# Patient Record
Sex: MALE | Race: ASIAN | NOT HISPANIC OR LATINO | Employment: FULL TIME
[De-identification: names, ages, dates, MRNs, and addresses within clinical notes are randomized per-mention and may not be internally consistent; named-entity substitution may affect disease eponyms.]

---

## 2019-11-08 ENCOUNTER — HEALTH MAINTENANCE LETTER (OUTPATIENT)
Age: 44
End: 2019-11-08

## 2020-01-29 ENCOUNTER — OFFICE VISIT (OUTPATIENT)
Dept: FAMILY MEDICINE | Facility: CLINIC | Age: 45
End: 2020-01-29
Payer: COMMERCIAL

## 2020-01-29 ENCOUNTER — TELEPHONE (OUTPATIENT)
Dept: OTOLARYNGOLOGY | Facility: CLINIC | Age: 45
End: 2020-01-29

## 2020-01-29 VITALS
WEIGHT: 179 LBS | HEART RATE: 64 BPM | DIASTOLIC BLOOD PRESSURE: 85 MMHG | OXYGEN SATURATION: 96 % | BODY MASS INDEX: 27.62 KG/M2 | SYSTOLIC BLOOD PRESSURE: 131 MMHG

## 2020-01-29 DIAGNOSIS — M27.8 MASS OF JAW: Primary | ICD-10-CM

## 2020-01-29 ASSESSMENT — PAIN SCALES - GENERAL: PAINLEVEL: NO PAIN (0)

## 2020-01-29 NOTE — PROGRESS NOTES
[facility]  Primary Care Center   Kareen Nunez MD PhD  01/29/2020      Chief Complaint:   Mass on jaw    History of Present Illness:   Belen Arredondo is a 44 year old male with a history of depression who presents for evaluation of a mass on his right jawline. This has been present for a year. It comes and goes and varies in size daily. It is not painful. He denies fever, chills, sore throat, ear pain, or ear pressure. He does note that his parotid gland hs been swollen for a long time. Two years ago he took a course of Augmentin for parotitis per Urgent Care.    Review of Systems:   Pertinent items are noted in HPI or as in patient entered ROS below, remainder of complete ROS is negative.   Fevers, chills, night sweats, fatigue, unintentional weight change: Negative   Vision change, double vision, red eyes: Negative  Hearing changes, sinus pain, throat pain: Negative   Chest pain, palpitations, pain with walking: Negative   Cough, shortness of breath, wheezing: Negative   Nausea, vomiting, diarrhea, constipation, abdominal pain, heartburn: Negative  Change in urine, incontinence, sexual dysfunction: Negative   Joint pain, muscle pain, swelling: Negative   Concerning lesions or moles, rash: Negative   Loss of strength or sensation, numbness or tingling, dizziness, headache: Negative   Excessive thirst, heat or cold intolerance: Negative   Concerning bumps, bleeding problems: Negative   Environmental allergies: Negative   Depression or anxiety, sleep problems: Negative    Active Medications:   No current outpatient medications.      Allergies:   Penicillins and Sulfa drugs      Past Medical History:  Major depressive disorder  Folliculitis     Past Surgical History:  History reviewed. No pertinent past surgical history.     Family History:   Diabetes - maternal grandmother  Hypertension - maternal grandmother       Social History:   The patient is , a current some day smoker (0.33 ppd), and does rarely  consume alcohol.     Physical Exam:   /85 (BP Location: Right arm, Patient Position: Sitting, Cuff Size: Adult Regular)   Pulse 64   Wt 81.2 kg (179 lb)   SpO2 96%   BMI 27.62 kg/m     Gen:  middle-aged male in NAD  HEENT: PERRL fundi benign.  No sinus tenderness. Ears clear with good light reflex. OP MMM no lesions.  No drainage. Stensen's and Montcalm's ducts not tender or inflamed. Facial asymmetry with prominence of salivary gland on his right side. There is a small non-tender, pea-size mass on right jawline.   Neck  Supple.  Thyroid not palpable.  No carotid bruits.  No LAD  CVS exam: S1, S2 normal, no murmur, click, rub or gallop, regular rate and rhythm,   Respiratory: Normal - Clear to auscultation without rales, rhonchi, or wheezing.     Assessment and Plan:  Belen Arredondo is a 44 year old male with a history of depression who presents for evaluation of a mass on his right jawline.    (R22.0) Mass of jaw  (primary encounter diagnosis)  Comment: This mass has been present for a year and has changed in size and at times gone away. Likely a cyst, but unclear on examination.  Plan: OTOLARYNGOLOGY REFERRAL        Referred to ENT for further evaluation.    Follow-up: next week for physical.         Scribe Disclosure:  I, Dulce Steward, am serving as a scribe to document services personally performed by Kareen Nunez MD PhD at this visit, based upon the provider's statements to me. All documentation has been reviewed by the aforementioned provider prior to being entered into the official medical record.     Portions of this medical record were completed by a scribe. UPON MY REVIEW AND AUTHENTICATION BY ELECTRONIC SIGNATURE, this confirms (a) I performed the applicable clinical services, and (b) the record is accurate.

## 2020-01-29 NOTE — TELEPHONE ENCOUNTER
[facility] Call Center    Phone Message    May a detailed message be left on voicemail: yes    Reason for Call: Other: Primary care called in to schedule this patient for the referral in epic for mass of jaw. Please follow up with the patient to schedule. Thank you.     Action Taken: Message routed to:  [facility] ([facility]): ent

## 2020-01-29 NOTE — NURSING NOTE
Chief Complaint   Patient presents with     Mass     Pt reports mass on right lower jaw that has been present for about a year     LISA Vázquez at 4:16 PM sign on 1/29/2020

## 2020-01-30 NOTE — TELEPHONE ENCOUNTER
FUTURE VISIT INFORMATION      FUTURE VISIT INFORMATION:    Date: 2/12/2020    Time: 3:45PM    [place] [facility] [place]ERRAL INFORMATION:    Referring provider:  Dr Kareen Nunez    Referring providers clinic:  [facility]    Reason for visit/diagnosis  jaw mass    RECORDS REQUESTED FROM:       Clinic name Comments Records Status Imaging Status   [facility] 1/29/2020 notes with Dr Enrique Onofre [facility] 02/12/2018 urgent care notes with David Mcmanus MD  Care Everywhere

## 2020-02-05 ENCOUNTER — OFFICE VISIT (OUTPATIENT)
Dept: FAMILY MEDICINE | Facility: CLINIC | Age: 45
End: 2020-02-05
Payer: COMMERCIAL

## 2020-02-05 VITALS
HEART RATE: 65 BPM | BODY MASS INDEX: 27.88 KG/M2 | DIASTOLIC BLOOD PRESSURE: 79 MMHG | WEIGHT: 180.7 LBS | OXYGEN SATURATION: 98 % | SYSTOLIC BLOOD PRESSURE: 123 MMHG

## 2020-02-05 DIAGNOSIS — Z82.49 FHX: HEART DISEASE: ICD-10-CM

## 2020-02-05 DIAGNOSIS — R91.8 PULMONARY NODULES: ICD-10-CM

## 2020-02-05 DIAGNOSIS — Z00.00 ANNUAL PHYSICAL EXAM: Primary | ICD-10-CM

## 2020-02-05 DIAGNOSIS — E55.9 VITAMIN D DEFICIENCY: ICD-10-CM

## 2020-02-05 SDOH — HEALTH STABILITY: PHYSICAL HEALTH: ON AVERAGE, HOW MANY DAYS PER WEEK DO YOU ENGAGE IN MODERATE TO STRENUOUS EXERCISE (LIKE A BRISK WALK)?: 2 DAYS

## 2020-02-05 SDOH — HEALTH STABILITY: MENTAL HEALTH
STRESS IS WHEN SOMEONE FEELS TENSE, NERVOUS, ANXIOUS, OR CAN'T SLEEP AT NIGHT BECAUSE THEIR MIND IS TROUBLED. HOW STRESSED ARE YOU?: RATHER MUCH

## 2020-02-05 SDOH — HEALTH STABILITY: PHYSICAL HEALTH: ON AVERAGE, HOW MANY MINUTES DO YOU ENGAGE IN EXERCISE AT THIS LEVEL?: 40 MIN

## 2020-02-05 ASSESSMENT — PATIENT HEALTH QUESTIONNAIRE - PHQ9: SUM OF ALL RESPONSES TO PHQ QUESTIONS 1-9: 6

## 2020-02-05 ASSESSMENT — PAIN SCALES - GENERAL: PAINLEVEL: NO PAIN (0)

## 2020-02-05 NOTE — NURSING NOTE
Patient received TDAP  vaccine. Vaccine was given under the supervision of Dr. Nunez. See immunization list for administration details. Pt was advised to remain [place] [facility] [place] 15 minutes in case of an averse reaction. Given by Franchesca Oviedo CMA, EMT 3:42 PM 2/5/2020

## 2020-02-05 NOTE — NURSING NOTE
Chief Complaint   Patient presents with     Physical     Pt is here for physical exam      LISA Vázquez at 1:47 PM sign on 2/5/2020

## 2020-02-05 NOTE — PATIENT INSTRUCTIONS
Blood work when fasting  Tetanus today       Nutritious diet  Eat 1000 mg calcium total every day.  Many people achieve this by a diet containing calcium (milk, yogurt, cheese, and other dairy products, supplemented food) and 1 calcium pill. If you don't eat dairy, you should take a calcium supplement 2 times a day.  1000 IU of vitamin D on daily basis.    Eat a variety of fruits and vegetables and eat whole grains.  Try to choose foods with a high NuVal score, if your grocery store shows this number. High numbers, like 80 or 90, are nutritious foods, and low scores, like 10 are less nutritious foods.          Men should drink no more than 2 alcoholic beverages daily on average; women should drink no more than 1 alcoholic beverage daily on average.    Everyone should avoid all tobacco and nicotine-containing products.    Exercise: Aim for:  Moderate activity (where you can still talk while doing it, such as walking about 100 steps per minute, or 3,000 steps in 30 minutes) for 30 minutes at least 5 days a week  Or  Vigorous exercise (you are working so hard you are breathing hard and fast and your heart rate has gone up, such as playing basketball or soccer) at least 75 minutes weekly    If you have trouble doing 30 minutes of activity, all at once, try small bursts of activity. Go to www.abefitness.com for suggestions on how you can do this at home or work.     All adults should try to do muscle strengthening exercise at least 2 days a week    Safety  Always wear bike/motorcycle helmet and seatbelt in a vehicle  Make sure your home has smoke and carbon monoxide detectors.  Wear broad spectrum sunscreen of at least SPF 15 on sun-exposed skin.    Preventing disease  See your dentist at least once a year  Have your eyes checked every 1-2 years.  Get a flu shot each year.        Radiology (Imaging Center) [phone] ([facility], 1st Floor S)

## 2020-02-05 NOTE — PROGRESS NOTES
[facility]  Primary Care Center   Kareen Nunez MD PhD  02/05/2020      Chief Complaint:   Physical       History of Present Illness:   Belen Arredondo is a 45 year old male with a history of depression who presents for a physical. He has yet to see ENT for the jaw mass, which he was seen for here last week.     Depression: PHQ-9 was 6 today, his mood is effected by stress and pressure.      Healthcare maintenance:   Glucose: every 5 years, yearly if risk factors? - Recommended  Lipid panel: every 5 years, yearly if risk factors - Recommended  Hepatitis C (adults born 1945 - 1965): once per lifetime - negative 2009  Immunizations (Tetanus every 10 years, Influenza yearly, Pneumonia PPV-23 and PCV-13 age ? 65 or sooner if risk factors) - TDaP  recommended  Immunization History   Administered Date(s) Administered     HepB 08/21/2009     TD (ADULT, 7+) 06/11/2012     TDAP Vaccine (Boostrix) 02/05/2020     Tdap (Adacel,Boostrix) 08/03/2009      The following health maintenance issues were also reviewed and addressed as needed:  Blood pressure (>18 years annually)  Lifestyle habits (including exercise, diet, weight)  Health risk behaviors (sexual history, alcohol, tobacco, drug use, partner violence)    Review of Systems:   Fevers, chills, night sweats, fatigue, unintentional weight change: Negative   Vision change, double vision, red eyes: Negative  Hearing changes, sinus pain, throat pain: Negative   Chest pain, palpitations, pain with walking: Negative   Cough, shortness of breath, wheezing: Negative   Nausea, vomiting, diarrhea, constipation, abdominal pain, heartburn: Negative  Change in urine, incontinence, sexual dysfunction: Negative   Joint pain, muscle pain, swelling: Negative   Concerning lesions or moles, rash: Negative   Loss of strength or sensation, numbness or tingling, dizziness, headache: Negative   Excessive thirst, heat or cold intolerance: Negative   Concerning bumps, bleeding problems: Negative    Environmental allergies: Negative   Depression or anxiety, sleep problems: Negative     Active Medications:   Multi-vitamin     Allergies:   Penicillins and Sulfa drugs      Past Medical History:  Depression  Folliculitis     Family History:   Maternal grandmother: diabetes mellitus, hypertension   Sister: stage IV lung cancer (smoker)  Father: hypercholesterolemia (in good shape)    Social History:   , sexually active  One 14 year old son, 50:50 custody split  Former some day smoker, quit 2011  Rare alcohol use  Denies substance use  Research  in oncology     Physical Exam:   /79 (BP Location: Right arm, Patient Position: Sitting, Cuff Size: Adult Regular)   Pulse 65   Wt 82 kg (180 lb 11.2 oz)   SpO2 98%   BMI 27.88 kg/m     Gen:  45 year old male in NAD  HEENT: PERRL fundi normal  Ears clear with good light reflex.  OP MMM no lesions.  No drainage  Neck  Supple.  Thyroid not palpable  No carotid bruits.  No LAD. Facial asymmetry with prominence of salivary gland on his right side. There is a small non-tender, pea-size mass on right jawline.   CVS exam: S1, S2 normal, no murmur, click, rub or gallop, regular rate and rhythm, no pedal edema, no JVD.  Respiratory: Normal - Clear to auscultation without rales, rhonchi, or wheezing.  Abd Soft ND NT  BS active  No masses or HSM   (male): circumcised, normal male genitalia without lesions or urethral discharge, no hernia  Ext   Good pulses. No edema  Musculoskeletal: spine is straight, extremities full ROM, no deformity  Neuro: Neurological exam reveals normal without focal findings, mental status, speech normal, alert and oriented x iii, MIGUE, reflexes normal and symmetric.      PHQ-9 SCORE 9/4/2013 2/5/2020   PHQ-9 Total Score 14 -   PHQ-9 Total Score - 6     Examination:  CT ABDOMEN/PELVIS W/O CONT, Jan 25, 2008 3:32:00 PM     Comparison: None.      History:  LEFT KIDNEY PAIN       Technique: Volumetric helical acquisition of CT images  from the lung  bases through the symphysis pubis after the uneventful administration  of intravenous and oral  contrast.     Findings:      Chest:  There is a 2.5 mm ill-defined ground glass nodule in the left  lung base, best seen on image number 11, series 4. There is a 3 mm  subpleural nodule in the anterior aspect of the left lung base, best  seen on image  8, series 4.     Lungs are clear.     No mediastinal, hilar, or axillary lymphadenopathy.     No pleural or pericardial effusion.     Great vessels are grossly unremarkable.        Abdomen/Pelvis:       Liver parenchyma is unremarkable. No intrahepatic or extrahepatic  biliary dilatation.      Gall bladder appears normal.     Pancreas appears normal.     Spleen appears normal. There are a couple small splenules in the  splenic hilum.     Both adrenal appears normal.     Both kidneys appears normal.     There are multiple prominent paraaortic, precaval and aortocaval lymph  nodes, the largest on measuring 1.3 x 0.6 cm, best seen on image #143,  series 3. There is a 5 mm right cardiophrenic lymph node, best seen on  image #17, series 3. There are multiple slightly prominent mesenteric  lymph nodes, the largest one measuring 1 x 0.8 cm, best seen on image  #99, series 3.      No free fluid.     Pelvic organs appear unremarkable.     Bones: No concerning lytic or sclerotic bone lesions.        Impression:      1.  A couple small pulmonary nodules, if low risk no follow up needed.  If high risk follow up after 1 year can be considered.  2. Multiple slightly prominent mesenteric, paraaortic, precaval lymph  nodes.     Assessment and Plan:   Belen Arredondo is a healthy 45 year old male with a history of depression and positive family history of coronary artery disease who presents for a physical.    (Z00.00) Annual physical exam  (primary encounter diagnosis)  Comment: Routine health maintenance reviewed and updated as appropriate.   Plan: Lipid Panel, Basic Metabolic  Panel, Hemoglobin,        TSH with free T4 reflex, TDAP VACCINE         (BOOSTRIX)          (E55.9) Vitamin D deficiency  Comment: vitamin D was low in 2013 at 17, will recheck with his other labs. He is not taking a separate vitamin D   Plan: 25- [place]-Vitamin D          (R91.8) Pulmonary nodules  Comment: 2008 CT for abdominal pain incedentally found a pulmonary nodule in the left lung base.   Plan: CT Chest w/o Contrast for f/u           Family history of coronary artery disease  He is at risk due to male sex and family history, will work hard to reduce his modifiable risk factors. Has good BP   Plan: encourage exercise and will check cholesterol panel.     Follow-up: 1 year          Scribe Disclosure:  I, Samuel Kendall, am serving as a scribe to document services personally performed by Kareen Nunez MD PhD at this visit, based upon the provider's statements to me. All documentation has been reviewed by the aforementioned provider prior to being entered into the official medical record.    Portions of this medical record were completed by a scribe. UPON MY REVIEW AND AUTHENTICATION BY ELECTRONIC SIGNATURE, this confirms (a) I performed the applicable clinical services, and (b) the record is accurate.

## 2020-02-10 DIAGNOSIS — M27.8 MASS OF JAW: Primary | ICD-10-CM

## 2020-02-12 ENCOUNTER — PRE VISIT (OUTPATIENT)
Dept: OTOLARYNGOLOGY | Facility: CLINIC | Age: 45
End: 2020-02-12

## 2020-02-12 ENCOUNTER — ANCILLARY PROCEDURE (OUTPATIENT)
Dept: CT IMAGING | Facility: CLINIC | Age: 45
End: 2020-02-12
Attending: FAMILY MEDICINE
Payer: COMMERCIAL

## 2020-02-12 ENCOUNTER — ANCILLARY PROCEDURE (OUTPATIENT)
Dept: CT IMAGING | Facility: CLINIC | Age: 45
End: 2020-02-12
Attending: OTOLARYNGOLOGY
Payer: COMMERCIAL

## 2020-02-12 ENCOUNTER — OFFICE VISIT (OUTPATIENT)
Dept: OTOLARYNGOLOGY | Facility: CLINIC | Age: 45
End: 2020-02-12
Attending: FAMILY MEDICINE
Payer: COMMERCIAL

## 2020-02-12 VITALS
BODY MASS INDEX: 27.13 KG/M2 | TEMPERATURE: 97.5 F | HEART RATE: 57 BPM | RESPIRATION RATE: 16 BRPM | WEIGHT: 179 LBS | DIASTOLIC BLOOD PRESSURE: 73 MMHG | HEIGHT: 68 IN | SYSTOLIC BLOOD PRESSURE: 120 MMHG

## 2020-02-12 DIAGNOSIS — R91.8 PULMONARY NODULES: ICD-10-CM

## 2020-02-12 DIAGNOSIS — M27.8 MASS OF JAW: ICD-10-CM

## 2020-02-12 RX ORDER — IOPAMIDOL 755 MG/ML
100 INJECTION, SOLUTION INTRAVASCULAR ONCE
Status: COMPLETED | OUTPATIENT
Start: 2020-02-12 | End: 2020-02-12

## 2020-02-12 RX ADMIN — IOPAMIDOL 100 ML: 755 INJECTION, SOLUTION INTRAVASCULAR at 15:11

## 2020-02-12 ASSESSMENT — MIFFLIN-ST. JEOR: SCORE: 1663.19

## 2020-02-12 ASSESSMENT — PAIN SCALES - GENERAL: PAINLEVEL: NO PAIN (0)

## 2020-02-12 NOTE — LETTER
2020       RE: Belen Arredondo  [address]  [place] [address]     Dear Colleague,    Thank you for referring your patient, Belen Arredondo, to the [facility] E[facility]. Please see a copy of my visit note below.      2020      Kareen Nunez MD   [facility] [address]      Dear Dr. Oh Nunez:      Thank you for requesting consultation on Belen Arredondo.  As you know, he is a 45-year-old gentleman who recently noticed a swelling along his right jawline.  He noticed it about a year ago.  It has waxed and waned a little bit but has gotten smaller over time.  He also relates this to what sounds like an episode of parotitis at some point in the past year where he was treated with antibiotics on the right side as well.  He has not had any pain and no purulent discharge in the mouth.  He has had no fevers, chills or sweats.  He has had no odynophagia, dysphagia, hoarseness or otalgia.         Past Medical History:   Diagnosis Date     Depressive disorder 2006     Jaw mass      MDD (major depressive disorder)      NO ACTIVE PROBLEMS      Past Surgical History:   Procedure Laterality Date     none       No current outpatient medications on file.  No current facility-administered medications for this visit.   Allergies   Allergen Reactions     Penicillins Anaphylaxis     Sulfa Drugs Dermatitis     Social History     Tobacco Use     Smoking status: Former Smoker     Packs/day: 1.00     Years: 10.00     Pack years: 10.00     Types: Cigarettes     Start date: 1990     Last attempt to quit: 2011     Years since quittin.0     Smokeless tobacco: Never Used   Substance Use Topics     Alcohol use: Yes     Comment: rare occasional beer      Review Of Systems  Skin: negative  Eyes: negative  Ears/Nose/Throat: negative  Respiratory: No shortness of breath, dyspnea on exertion, cough, or hemoptysis  Cardiovascular: negative  Gastrointestinal:  negative  Genitourinary: negative  Musculoskeletal: negative  Neurologic: negative  Psychiatric: negative  Hematologic/Lymphatic/Immunologic: negative  Endocrine: negative    PHYSICAL EXAMINATION:    Constitutional:  The patient was well-groomed and in no acute distress.    Skin:  Warm and pink.   Neurologic:  Alert and oriented x3. Cranial nerves III-XII within normal limits. Voice quality is normal.     Psychiatric:  The patient's affect was calm, cooperative, and appropriate.    Respiratory: Breathing comfortably without stridor or exertion of accessory muscles.   Eyes:  Pupils were equal and reactive. Extraocular movements are intact.   Head:  Normocephalic and atraumatic. No lesions or scars.    Ears:  External auditory canals and tympanic membranes were clear.    Nose:  Sinuses were nontender. Anterior rhinoscopy revealed midline septum and absence of purulence or polyps.   Oral cavity/Oropharynx:  Normal tongue, floor of mouth, buccal mucosa, and palate. No lesions or masses on inspection or palpation. No abnormal lymph tissue in the oropharynx. The pterygoid region is nontender.    Hypopharynx/Larynx:  I cannot see his larynx with a mirror today.   Neck:   The parotid is soft without masses.  Supple with normal laryngeal and tracheal landmarks.   Lymphatic:  There is no palpable lymphadenopathy or other masses in the neck or parotid.       IMAGING:  The patient had a CT scan that is not read formally yet but to my eye looks normal.      IMPRESSION:  Likely normal exam.      PLAN:  I counseled the patient that I do not see anything on the CT scan.  We will call him with the final report, however.  Most likely, this is nothing too concerning and may be a small subdermal cyst or something that swells intermittently in his parotid gland relating to ductal obstruction.  In any case, he will let us know if anything changes in the future or gets worse.  We will let him know what the final determination of his scan  is.      Sincerely,      Antoni Rangel M.D.        Otolaryngology-Head & Neck Surgery    [phone]

## 2020-02-12 NOTE — PROGRESS NOTES
2020      Kareen Nunez MD   [facility] [address]      Dear Dr. Oh Nunez:      Thank you for requesting consultation on Belen Arredondo.  As you know, he is a 45-year-old gentleman who recently noticed a swelling along his right jawline.  He noticed it about a year ago.  It has waxed and waned a little bit but has gotten smaller over time.  He also relates this to what sounds like an episode of parotitis at some point in the past year where he was treated with antibiotics on the right side as well.  He has not had any pain and no purulent discharge in the mouth.  He has had no fevers, chills or sweats.  He has had no odynophagia, dysphagia, hoarseness or otalgia.         Past Medical History:   Diagnosis Date     Depressive disorder 2006     Jaw mass      MDD (major depressive disorder)      NO ACTIVE PROBLEMS      Past Surgical History:   Procedure Laterality Date     none       No current outpatient medications on file.  No current facility-administered medications for this visit.   Allergies   Allergen Reactions     Penicillins Anaphylaxis     Sulfa Drugs Dermatitis     Social History     Tobacco Use     Smoking status: Former Smoker     Packs/day: 1.00     Years: 10.00     Pack years: 10.00     Types: Cigarettes     Start date: 1990     Last attempt to quit: 2011     Years since quittin.0     Smokeless tobacco: Never Used   Substance Use Topics     Alcohol use: Yes     Comment: rare occasional beer      Review Of Systems  Skin: negative  Eyes: negative  Ears/Nose/Throat: negative  Respiratory: No shortness of breath, dyspnea on exertion, cough, or hemoptysis  Cardiovascular: negative  Gastrointestinal: negative  Genitourinary: negative  Musculoskeletal: negative  Neurologic: negative  Psychiatric: negative  Hematologic/Lymphatic/Immunologic: negative  Endocrine: negative    PHYSICAL EXAMINATION:    Constitutional:  The patient was well-groomed and in no acute distress.    Skin:  Warm and  pink.   Neurologic:  Alert and oriented x3. Cranial nerves III-XII within normal limits. Voice quality is normal.     Psychiatric:  The patient's affect was calm, cooperative, and appropriate.    Respiratory: Breathing comfortably without stridor or exertion of accessory muscles.   Eyes:  Pupils were equal and reactive. Extraocular movements are intact.   Head:  Normocephalic and atraumatic. No lesions or scars.    Ears:  External auditory canals and tympanic membranes were clear.    Nose:  Sinuses were nontender. Anterior rhinoscopy revealed midline septum and absence of purulence or polyps.   Oral cavity/Oropharynx:  Normal tongue, floor of mouth, buccal mucosa, and palate. No lesions or masses on inspection or palpation. No abnormal lymph tissue in the oropharynx. The pterygoid region is nontender.    Hypopharynx/Larynx:  I cannot see his larynx with a mirror today.   Neck:   The parotid is soft without masses.  Supple with normal laryngeal and tracheal landmarks.   Lymphatic:  There is no palpable lymphadenopathy or other masses in the neck or parotid.       IMAGING:  The patient had a CT scan that is not read formally yet but to my eye looks normal.      IMPRESSION:  Likely normal exam.      PLAN:  I counseled the patient that I do not see anything on the CT scan.  We will call him with the final report, however.  Most likely, this is nothing too concerning and may be a small subdermal cyst or something that swells intermittently in his parotid gland relating to ductal obstruction.  In any case, he will let us know if anything changes in the future or gets worse.  We will let him know what the final determination of his scan is.      Sincerely,      Antoni Rangel M.D.        Otolaryngology-Head & Neck Surgery    [phone]

## 2020-02-12 NOTE — NURSING NOTE
"Chief Complaint   Patient presents with     Consult     mass on jaw      Blood pressure 120/73, pulse 57, temperature 97.5  F (36.4  C), resp. rate 16, height 1.73 m (5' 8.11\"), weight 81.2 kg (179 lb).    Damian Montaño LPN    "

## 2020-02-14 ENCOUNTER — PATIENT OUTREACH (OUTPATIENT)
Dept: OTOLARYNGOLOGY | Facility: CLINIC | Age: 45
End: 2020-02-14

## 2020-02-19 NOTE — PROGRESS NOTES
Called and left message for patient with the following CT scan results:                                                               Impression:  1. Mild symmetric enlargement of the parotid glands, with geographic  enhancement and mild thickening of the overlying platysma. Question of  parotitis. Clinical correlation is recommended.  2. No evidence for mandibular mass.  3. Mildly enlarged cervical lymph nodes bilaterally, possibly  Reactive.    He will follow-up with Dr. Rangel as needed. He was encouraged to return call with any further questions or concerns.     Lise Yepez, RN, BSN

## 2020-02-27 DIAGNOSIS — Z00.00 ANNUAL PHYSICAL EXAM: ICD-10-CM

## 2020-02-27 DIAGNOSIS — E55.9 VITAMIN D DEFICIENCY: ICD-10-CM

## 2020-02-27 LAB
ANION GAP SERPL CALCULATED.3IONS-SCNC: 5 MMOL/L (ref 3–14)
BUN SERPL-MCNC: 21 MG/DL (ref 7–30)
CALCIUM SERPL-MCNC: 9.3 MG/DL (ref 8.5–10.1)
CHLORIDE SERPL-SCNC: 108 MMOL/L (ref 94–109)
CHOLEST SERPL-MCNC: 237 MG/DL
CO2 SERPL-SCNC: 25 MMOL/L (ref 20–32)
CREAT SERPL-MCNC: 0.8 MG/DL (ref 0.66–1.25)
DEPRECATED CALCIDIOL+CALCIFEROL SERPL-MC: 26 UG/L (ref 20–75)
GFR SERPL CREATININE-BSD FRML MDRD: >90 ML/MIN/{1.73_M2}
GLUCOSE SERPL-MCNC: 96 MG/DL (ref 70–99)
HDLC SERPL-MCNC: 42 MG/DL
HGB BLD-MCNC: 14.8 G/DL (ref 13.3–17.7)
LDLC SERPL CALC-MCNC: 159 MG/DL
NONHDLC SERPL-MCNC: 195 MG/DL
POTASSIUM SERPL-SCNC: 4 MMOL/L (ref 3.4–5.3)
SODIUM SERPL-SCNC: 138 MMOL/L (ref 133–144)
TRIGL SERPL-MCNC: 179 MG/DL
TSH SERPL DL<=0.005 MIU/L-ACNC: 2.61 MU/L (ref 0.4–4)

## 2020-12-06 ENCOUNTER — HEALTH MAINTENANCE LETTER (OUTPATIENT)
Age: 45
End: 2020-12-06

## 2021-04-11 ENCOUNTER — HEALTH MAINTENANCE LETTER (OUTPATIENT)
Age: 46
End: 2021-04-11

## 2021-09-25 ENCOUNTER — HEALTH MAINTENANCE LETTER (OUTPATIENT)
Age: 46
End: 2021-09-25

## 2021-10-13 ENCOUNTER — OFFICE VISIT (OUTPATIENT)
Dept: FAMILY MEDICINE | Facility: CLINIC | Age: 46
End: 2021-10-13
Payer: COMMERCIAL

## 2021-10-13 VITALS
HEART RATE: 62 BPM | OXYGEN SATURATION: 99 % | BODY MASS INDEX: 26.54 KG/M2 | WEIGHT: 175.1 LBS | SYSTOLIC BLOOD PRESSURE: 128 MMHG | HEIGHT: 68 IN | DIASTOLIC BLOOD PRESSURE: 82 MMHG

## 2021-10-13 DIAGNOSIS — N52.9 ERECTILE DYSFUNCTION, UNSPECIFIED ERECTILE DYSFUNCTION TYPE: Primary | ICD-10-CM

## 2021-10-13 PROCEDURE — 99213 OFFICE O/P EST LOW 20 MIN: CPT | Performed by: FAMILY MEDICINE

## 2021-10-13 RX ORDER — SILDENAFIL 25 MG/1
TABLET, FILM COATED ORAL
Qty: 25 TABLET | Refills: 1 | Status: SHIPPED | OUTPATIENT
Start: 2021-10-13 | End: 2023-02-04

## 2021-10-13 ASSESSMENT — PATIENT HEALTH QUESTIONNAIRE - PHQ9
5. POOR APPETITE OR OVEREATING: NOT AT ALL
SUM OF ALL RESPONSES TO PHQ QUESTIONS 1-9: 2

## 2021-10-13 ASSESSMENT — ANXIETY QUESTIONNAIRES
2. NOT BEING ABLE TO STOP OR CONTROL WORRYING: NOT AT ALL
7. FEELING AFRAID AS IF SOMETHING AWFUL MIGHT HAPPEN: NOT AT ALL
3. WORRYING TOO MUCH ABOUT DIFFERENT THINGS: NOT AT ALL
5. BEING SO RESTLESS THAT IT IS HARD TO SIT STILL: NOT AT ALL
1. FEELING NERVOUS, ANXIOUS, OR ON EDGE: NOT AT ALL
6. BECOMING EASILY ANNOYED OR IRRITABLE: NOT AT ALL
GAD7 TOTAL SCORE: 0
IF YOU CHECKED OFF ANY PROBLEMS ON THIS QUESTIONNAIRE, HOW DIFFICULT HAVE THESE PROBLEMS MADE IT FOR YOU TO DO YOUR WORK, TAKE CARE OF THINGS AT HOME, OR GET ALONG WITH OTHER PEOPLE: NOT DIFFICULT AT ALL

## 2021-10-13 ASSESSMENT — PAIN SCALES - GENERAL: PAINLEVEL: NO PAIN (0)

## 2021-10-13 ASSESSMENT — MIFFLIN-ST. JEOR: SCORE: 1638.75

## 2021-10-13 NOTE — NURSING NOTE
Chief Complaint   Patient presents with     Consult     Sexual problems; difficulty maintaining erection during sex       LISA Mohan at 1:28 PM on 10/13/2021.

## 2021-10-13 NOTE — PROGRESS NOTES
"    Assessment & Plan     Erectile dysfunction, unspecified erectile dysfunction type  No cardiac history - has not tried medication - will try viagra - discussed how to use this   - sildenafil (VIAGRA) 25 MG tablet  Dispense: 25 tablet; Refill: 1    Return if symptoms worsen or fail to improve.    Kareen Nunez MD PhD  [facility] PRIMARY [facility]    Time note (e3, 20'): The total time (on the date of service) for this service was >20 minutes, including discussion/face-to-face, chart review, interpretation not otherwise reported, documentation, and updating of the computerized record.      Chinmay Glover is a 46 year old who presents for the following health issues - erectile dysfunction    HPI 47 yo and having erectile dysfunction  - has had problems in past but now almost every time.  Not taking any medications.  Can have an erection but doesn't last long enough - but can have ejaculation.      No hx of HTN, no chest pain or palpitations, no hx of SOB     Review of Systems   Constitutional, HEENT, cardiovascular, pulmonary, GI, , musculoskeletal, neuro, skin, endocrine and psych systems are negative, except as otherwise noted.PHQ9=2      Objective    /82 (BP Location: Right arm, Patient Position: Sitting, Cuff Size: Adult Regular)   Pulse 62   Ht 1.727 m (5' 8\")   Wt 79.4 kg (175 lb 1.6 oz)   SpO2 99%   BMI 26.62 kg/m    Body mass index is 26.62 kg/m .  Physical Exam   GENERAL: healthy, alert and no distress  NECK: no adenopathy, no asymmetry, masses, or scars and thyroid normal to palpation  RESP: lungs clear to auscultation - no rales, rhonchi or wheezes  CV: regular rate and rhythm, normal S1 S2, no S3 or S4, no murmur, click or rub, no peripheral edema and peripheral pulses strong   (male): normal male genitalia without lesions or urethral discharge, no hernia  MS: no gross musculoskeletal defects noted, no edema  PSYCH: mentation appears normal, affect " normal/bright  LYMPH: no cervical  adenopathy    Kareen Nunez MD, PhD

## 2021-10-14 ASSESSMENT — ANXIETY QUESTIONNAIRES: GAD7 TOTAL SCORE: 0

## 2021-10-18 DIAGNOSIS — N52.9 ED (ERECTILE DYSFUNCTION): Primary | ICD-10-CM

## 2021-10-18 DIAGNOSIS — N52.9 ERECTILE DYSFUNCTION, UNSPECIFIED ERECTILE DYSFUNCTION TYPE: ICD-10-CM

## 2021-10-18 RX ORDER — SILDENAFIL CITRATE 20 MG/1
TABLET ORAL
Status: CANCELLED | OUTPATIENT
Start: 2021-10-18

## 2021-10-18 RX ORDER — SILDENAFIL 25 MG/1
25 TABLET, FILM COATED ORAL DAILY PRN
Qty: 15 TABLET | Refills: 1 | Status: SHIPPED | OUTPATIENT
Start: 2021-10-18 | End: 2023-05-27

## 2021-10-18 NOTE — TELEPHONE ENCOUNTER
Fax received from patient's pharmacy requesting sildenafil 20 mg tablets due to cost.     Will send to PCP for review.        Fiona Gonzalez) Say, RN    10-18-21  25mg tablet of sildenafil ordered - #=15  Kareen Nunez MD, PhD

## 2022-05-07 ENCOUNTER — HEALTH MAINTENANCE LETTER (OUTPATIENT)
Age: 47
End: 2022-05-07

## 2023-01-07 ENCOUNTER — HEALTH MAINTENANCE LETTER (OUTPATIENT)
Age: 48
End: 2023-01-07

## 2023-01-28 DIAGNOSIS — N52.9 ERECTILE DYSFUNCTION, UNSPECIFIED ERECTILE DYSFUNCTION TYPE: ICD-10-CM

## 2023-02-01 RX ORDER — SILDENAFIL 25 MG/1
TABLET, FILM COATED ORAL
Qty: 25 TABLET | Refills: 0 | OUTPATIENT
Start: 2023-02-01

## 2023-02-01 NOTE — TELEPHONE ENCOUNTER
SILDENAFIL 25 MG TABLET      Last Written Prescription Date:  10/13/21  Last Fill Quantity: 25,   # refills: 1  Last Office Visit : 10/13/21  Future Office visit:  none    Routing refill request to provider for review/approval because:  Overdue for 12mo office visit  Pt has 2 different orders for sildenafil.

## 2023-02-03 NOTE — PROGRESS NOTES
3  SUBJECTIVE:   CC: Belen Arredondo is an 47 year old male who presents for preventive health visit.       Patient has been advised of split billing requirements and indicates understanding: Yes  Healthy Habits:    Do you get at least three servings of calcium containing foods daily (dairy, green leafy vegetables, etc.)? Just eat greens and takes multivitamin     Amount of exercise or daily activities, outside of work: none - stopped going to the gym     Problems taking medications regularly No    Medication side effects: No    Have you had an eye exam in the past two years? yes    Do you see a dentist twice per year? Due to be seen     Do you have sleep apnea, excessive snoring or daytime drowsiness?yes  Excessive snoring       PROBLEMS TO ADD ON...  ED  - sildenafil - works well      Pulmonary nodules - no change for 10 yrs - last CT was 12/2020   IMPRESSION: Multiple sub-5 mm pulmonary nodules within both lungs. Of  the nodules that were included within CT abdomen pelvis 1/25/2008,  there is no substantial change. Consider CT chest in 12 months if  patient is at high risk. Otherwise, no dedicated follow-up imaging is  Necessary  Noted mild coronary calcium  - wants a coronary calcium CT    Fungal infection - topical doesn't work  -       Elevated bp - recently noticing spikes -  In general below 130/80     Depression: PHQ-9 was 4 today, his mood is good - no harmful thoughts - work has pressure but OK with it      Healthcare maintenance:   Glucose: every 5 years, yearly if risk factors? - Recommended glucose =96 2020  Lipid panel: every 5 years, yearly if risk factors - Recommended LDL  =159  2020  Hepatitis C (adults born 1947 - 1965): once per lifetime - negative 2009  Immunizations (Tetanus every 10 years, Influenza yearly, Pneumonia PPV-23 and PCV-13 age ? 65 or sooner if risk factors) - TDaP  recommended  Colonoscopy - due  Lo dose lung CT - only 10 yr pack hx quit 12 yrs ago  Doesn't meet criteria         Today's PHQ-2 Score:   PHQ-2 (  [employer]) 10/13/2021 2020   Q1: Little interest or pleasure in doing things 0 1   Q2: Feeling down, depressed or hopeless 0 0   PHQ-2 Score 0 1   PHQ-2 Total Score (12-17 Years)- Positive if 3 or more points; Administer PHQ-A if positive 0 1       Abuse: Current or Past(Physical, Sexual or Emotional)- No  Do you feel safe in your environment? Yes    Have you ever done Advance Care Planning? (For example, a Health Directive, POLST, or a discussion with a medical provider or your loved ones about your wishes): No, advance care planning information given to patient to review.  Patient plans to discuss their wishes with loved ones or provider.      Social History     Tobacco Use     Smoking status: Former     Packs/day: 1.00     Years: 10.00     Pack years: 10.00     Types: Cigarettes     Start date: 1990     Quit date: 2011     Years since quittin.0     Smokeless tobacco: Never   Substance Use Topics     Alcohol use: Yes     Comment: rare occasional beer      If you drink alcohol do you typically have >3 drinks per day or >7 drinks per week? No                      Last PSA: No results found for: PSA    Reviewed orders with patient. Reviewed health maintenance and updated orders accordingly - Yes  Labs reviewed in EPIC  BP Readings from Last 3 Encounters:   23 123/80   10/13/21 128/82   20 120/73    Wt Readings from Last 3 Encounters:   23 80.3 kg (177 lb)   10/13/21 79.4 kg (175 lb 1.6 oz)   20 81.2 kg (179 lb)                  Patient Active Problem List   Diagnosis     Folliculitis     Major depressive disorder, recurrent episode, moderate (H)     Past Surgical History:   Procedure Laterality Date     none         Social History     Tobacco Use     Smoking status: Former     Packs/day: 1.00     Years: 10.00     Pack years: 10.00     Types: Cigarettes     Start date: 1990     Quit date: 2011     Years since quittin.0      Smokeless tobacco: Never   Substance Use Topics     Alcohol use: Yes     Comment: rare occasional beer      Family History   Problem Relation Age of Onset     Coronary Artery Disease Father      Hyperlipidemia Father      Heart Disease Father      Lung Cancer Sister      Cancer Sister         stage 4 NSCLC     Diabetes Maternal Grandmother      Hypertension Maternal Grandmother          Current Outpatient Medications   Medication Sig Dispense Refill     sildenafil (VIAGRA) 25 MG tablet Take 2 tablets or 50mg  30 minutes to 4 hrs prior to intercourse 25 tablet 1     sildenafil (VIAGRA) 25 MG tablet Take 1 tablet (25 mg) by mouth daily as needed 15 tablet 1     terbinafine (LAMISIL) 250 MG tablet Take 1 tablet (250 mg) by mouth daily for 90 days 90 tablet 0     Allergies   Allergen Reactions     Penicillins Anaphylaxis     Sulfa Drugs Dermatitis       Reviewed and updated as needed this visit by clinical staff                  Reviewed and updated as needed this visit by Provider                 Past Medical History:   Diagnosis Date     Depressive disorder Jan 2006     Jaw mass      MDD (major depressive disorder)      NO ACTIVE PROBLEMS       Past Surgical History:   Procedure Laterality Date     none         ROS:  Answers for HPI/ROS submitted by the patient on 2/4/2023  General Symptoms: No  Skin Symptoms: No  HENT Symptoms: No  EYE SYMPTOMS: No  HEART SYMPTOMS: No  LUNG SYMPTOMS: No  INTESTINAL SYMPTOMS: No  URINARY SYMPTOMS: No  REPRODUCTIVE SYMPTOMS: No  SKELETAL SYMPTOMS: No  BLOOD SYMPTOMS: No  NERVOUS SYSTEM SYMPTOMS: No  MENTAL HEALTH SYMPTOMS: No        OBJECTIVE:   /80 (BP Location: Right arm, Patient Position: Sitting, Cuff Size: Adult Large)   Pulse 57   Wt 80.3 kg (177 lb)   SpO2 98%   BMI 26.91 kg/m    EXAM:  GENERAL: healthy, alert and no distress  EYES: Eyes grossly normal to inspection, PERRL and conjunctivae and sclerae normal  HENT: ear canals and TM's normal, nose and mouth without  ulcers or lesions  NECK: no adenopathy, no asymmetry, masses, or scars and thyroid normal to palpation  RESP: lungs clear to auscultation - no rales, rhonchi or wheezes  BREAST: normal without masses,  CV: regular rate and rhythm, normal S1 S2, no S3 or S4, no murmur, click or rub, no peripheral edema  ABDOMEN: soft, nontender, no hepatosplenomegaly, no masses and bowel sounds normal   (male): normal male genitalia without lesions or urethral discharge, no hernia  RECTAL: normal sphincter tone, no rectal masses, prostate normal size, smooth, nontender without nodules or masses  MS: no gross musculoskeletal defects noted, no edema  SKIN: toenail fungal infection left foot big toe and 5th toe - right big toe no rashes. Right anterior chest 2 small raised flesh color lesions below the right breat area   NEURO: Normal strength and tone, mentation intact and speech normal  PSYCH: mentation appears normal, affect normal/bright  LYMPH: no cervical, supraclavicular adenopathy    Labs reviewed in Epic    ASSESSMENT/PLAN:   (Z00.00) Annual physical exam  (primary encounter diagnosis)  Comment: immunizations UTD - colonoscopy recommended - PSA ordered  - previous smoker but doesn't meet criteria for screening   Plan: sildenafil (VIAGRA) 25 MG tablet        See orders     (R03.0) Elevated blood pressure reading without diagnosis of hypertension  Comment: on occasion gets elevated readings at home  - no meds - bp here today is normal  Plan: follow     (B35.1) Fungal toenail infection  Comment: check LFT's - lamisil for 3 months   Plan: terbinafine (LAMISIL) 250 MG tablet, Hepatic         panel (Albumin, ALT, AST, Bili, Alk Phos, TP)            (R06.83) Snoring  Comment: has loud snoring- possible sleep apnea   Plan: Adult Sleep Eval & Management          Referral            (E78.00) Hypercholesterolemia  Comment: LDL was 159 in 2020  - no statin  - retest  Plan: Lipid panel reflex to direct LDL Fasting, CT          "Coronary Calcium Scan            (Z82.49) FHx: coronary artery disease  Comment: on chest CT scan for pulmonary nodules  it was noted mild coronary calcium - +FHx  - will get a coronary calcium scan - consider starting a statin  -consider sending to preventative cardiology at [facility]    Plan: CT Coronary Calcium Scan            (L98.9) Skin lesion  Comment: lesion on anterior chest changing - getting bigger   Plan: Adult Dermatology Referral              Patient has been advised of split billing requirements and indicates understanding: Yes  COUNSELING:  Reviewed preventive health counseling, as reflected in patient instructions       Regular exercise       Healthy diet/nutrition       Consider Hep C screening for all patients one time for ages 18-79 years       Colorectal cancer screening       Osteoporosis prevention/bone health       Advance Care Planning    Estimated body mass index is 26.62 kg/m  as calculated from the following:    Height as of 10/13/21: 1.727 m (5' 8\").    Weight as of 10/13/21: 79.4 kg (175 lb 1.6 oz).    Weight management plan: Discussed healthy diet and exercise guidelines    He reports that he quit smoking about 12 years ago. His smoking use included cigarettes. He started smoking about 32 years ago. He has a 10.00 pack-year smoking history. He has never used smokeless tobacco.      Counseling Resources:  ATP IV Guidelines  Pooled Cohorts Equation Calculator  FRAX Risk Assessment  ICSI Preventive Guidelines  Dietary Guidelines for Americans, 2010  USDA's MyPlate  ASA Prophylaxis  Lung CA Screening    Kareen Nunez MD PhD  [facility] PRIMARY [facility]ARE CLINIC [place]    "

## 2023-02-04 ENCOUNTER — LAB (OUTPATIENT)
Dept: LAB | Facility: CLINIC | Age: 48
End: 2023-02-04
Payer: COMMERCIAL

## 2023-02-04 ENCOUNTER — OFFICE VISIT (OUTPATIENT)
Dept: FAMILY MEDICINE | Facility: CLINIC | Age: 48
End: 2023-02-04
Payer: COMMERCIAL

## 2023-02-04 VITALS
SYSTOLIC BLOOD PRESSURE: 123 MMHG | OXYGEN SATURATION: 98 % | DIASTOLIC BLOOD PRESSURE: 80 MMHG | BODY MASS INDEX: 26.91 KG/M2 | WEIGHT: 177 LBS | HEART RATE: 57 BPM

## 2023-02-04 DIAGNOSIS — B35.1 FUNGAL TOENAIL INFECTION: ICD-10-CM

## 2023-02-04 DIAGNOSIS — Z00.00 ANNUAL PHYSICAL EXAM: ICD-10-CM

## 2023-02-04 DIAGNOSIS — E78.00 HYPERCHOLESTEROLEMIA: ICD-10-CM

## 2023-02-04 DIAGNOSIS — R03.0 ELEVATED BLOOD PRESSURE READING WITHOUT DIAGNOSIS OF HYPERTENSION: ICD-10-CM

## 2023-02-04 DIAGNOSIS — Z00.00 ANNUAL PHYSICAL EXAM: Primary | ICD-10-CM

## 2023-02-04 DIAGNOSIS — Z82.49 FHX: CORONARY ARTERY DISEASE: ICD-10-CM

## 2023-02-04 DIAGNOSIS — N52.9 ERECTILE DYSFUNCTION, UNSPECIFIED ERECTILE DYSFUNCTION TYPE: ICD-10-CM

## 2023-02-04 DIAGNOSIS — R06.83 SNORING: ICD-10-CM

## 2023-02-04 DIAGNOSIS — L98.9 SKIN LESION: ICD-10-CM

## 2023-02-04 LAB
ALBUMIN SERPL BCG-MCNC: 4.9 G/DL (ref 3.5–5.2)
ALP SERPL-CCNC: 74 U/L (ref 40–129)
ALT SERPL W P-5'-P-CCNC: 29 U/L (ref 10–50)
AST SERPL W P-5'-P-CCNC: 32 U/L (ref 10–50)
BILIRUB DIRECT SERPL-MCNC: <0.2 MG/DL (ref 0–0.3)
BILIRUB SERPL-MCNC: 0.3 MG/DL
CHOLEST SERPL-MCNC: 210 MG/DL
HDLC SERPL-MCNC: 36 MG/DL
LDLC SERPL CALC-MCNC: 109 MG/DL
NONHDLC SERPL-MCNC: 174 MG/DL
PROT SERPL-MCNC: 7.6 G/DL (ref 6.4–8.3)
PSA SERPL-MCNC: 0.94 NG/ML (ref 0–2.5)
TRIGL SERPL-MCNC: 326 MG/DL

## 2023-02-04 PROCEDURE — 80061 LIPID PANEL: CPT | Performed by: PATHOLOGY

## 2023-02-04 PROCEDURE — 80076 HEPATIC FUNCTION PANEL: CPT | Performed by: PATHOLOGY

## 2023-02-04 PROCEDURE — 36415 COLL VENOUS BLD VENIPUNCTURE: CPT | Performed by: PATHOLOGY

## 2023-02-04 PROCEDURE — G0103 PSA SCREENING: HCPCS | Performed by: PATHOLOGY

## 2023-02-04 PROCEDURE — 99396 PREV VISIT EST AGE 40-64: CPT | Performed by: FAMILY MEDICINE

## 2023-02-04 RX ORDER — SILDENAFIL 25 MG/1
TABLET, FILM COATED ORAL
Qty: 25 TABLET | Refills: 1 | Status: SHIPPED | OUTPATIENT
Start: 2023-02-04

## 2023-02-04 RX ORDER — TERBINAFINE HYDROCHLORIDE 250 MG/1
250 TABLET ORAL DAILY
Qty: 90 TABLET | Refills: 0 | Status: SHIPPED | OUTPATIENT
Start: 2023-02-04 | End: 2023-05-22

## 2023-02-04 ASSESSMENT — ANXIETY QUESTIONNAIRES
5. BEING SO RESTLESS THAT IT IS HARD TO SIT STILL: NOT AT ALL
6. BECOMING EASILY ANNOYED OR IRRITABLE: NOT AT ALL
GAD7 TOTAL SCORE: 0
3. WORRYING TOO MUCH ABOUT DIFFERENT THINGS: NOT AT ALL
2. NOT BEING ABLE TO STOP OR CONTROL WORRYING: NOT AT ALL
7. FEELING AFRAID AS IF SOMETHING AWFUL MIGHT HAPPEN: NOT AT ALL
GAD7 TOTAL SCORE: 0
1. FEELING NERVOUS, ANXIOUS, OR ON EDGE: NOT AT ALL
IF YOU CHECKED OFF ANY PROBLEMS ON THIS QUESTIONNAIRE, HOW DIFFICULT HAVE THESE PROBLEMS MADE IT FOR YOU TO DO YOUR WORK, TAKE CARE OF THINGS AT HOME, OR GET ALONG WITH OTHER PEOPLE: NOT DIFFICULT AT ALL

## 2023-02-04 ASSESSMENT — PATIENT HEALTH QUESTIONNAIRE - PHQ9
5. POOR APPETITE OR OVEREATING: NOT AT ALL
SUM OF ALL RESPONSES TO PHQ QUESTIONS 1-9: 4

## 2023-02-04 NOTE — PATIENT INSTRUCTIONS
Blood work today  Use the lamisil for 3 months for toenail infection  Get coronary calcium CT study  Get sleep study  Watch your blood pressure   See derm about face lesions     Nutritious diet  Eat 1000 mg calcium total every day.  Many people achieve this by a diet containing calcium (milk, yogurt, cheese, and other dairy products, supplemented food) and 1 calcium pill. If you don't eat dairy, you should take a calcium supplement 2 times a day.  400 IU of vitamin D on daily basis.    Eat a variety of fruits and vegetables and eat whole grains.  Try to choose foods with a high NuVal score, if your grocery store shows this number. High numbers, like 80 or 90, are nutritious foods, and low scores, like 10 are less nutritious foods.          Men should drink no more than 2 alcoholic beverages daily on average; women should drink no more than 1 alcoholic beverage daily on average.    Everyone should avoid all tobacco and nicotine-containing products.    Exercise: Aim for:  Moderate activity (where you can still talk while doing it, such as walking about 100 steps per minute, or 3,000 steps in 30 minutes) for 30 minutes at least 5 days a week  Or  Vigorous exercise (you are working so hard you are breathing hard and fast and your heart rate has gone up, such as playing basketball or soccer) at least 75 minutes weekly    If you have trouble doing 30 minutes of activity, all at once, try small bursts of activity. Go to www.abefitness.com for suggestions on how you can do this at home or work.     All adults should try to do muscle strengthening exercise at least 2 days a week    Safety  Always wear bike/motorcycle helmet and seatbelt in a vehicle  Make sure your home has smoke and carbon monoxide detectors.  Wear broad spectrum sunscreen of at least SPF 15 on sun-exposed skin.    Preventing disease  See your dentist at least once a year  Have your eyes checked every 1-2 years.  Get a flu shot each year.

## 2023-02-04 NOTE — NURSING NOTE
Chief Complaint   Patient presents with     Physical     Pt here for physical       Ryan Maier CMA, EMT at 10:21 AM on 2/4/2023.

## 2023-02-06 ENCOUNTER — HOSPITAL ENCOUNTER (OUTPATIENT)
Dept: CT IMAGING | Facility: CLINIC | Age: 48
Discharge: HOME | End: 2023-02-06
Attending: FAMILY MEDICINE | Admitting: FAMILY MEDICINE

## 2023-02-06 ENCOUNTER — ANCILLARY ORDERS (OUTPATIENT)
Dept: FAMILY MEDICINE | Facility: CLINIC | Age: 48
End: 2023-02-06

## 2023-02-06 DIAGNOSIS — Z82.49 FHX: CORONARY ARTERY DISEASE: ICD-10-CM

## 2023-02-06 DIAGNOSIS — E78.00 HYPERCHOLESTEROLEMIA: ICD-10-CM

## 2023-02-06 PROCEDURE — 75571 CT HRT W/O DYE W/CA TEST: CPT | Mod: 26 | Performed by: INTERNAL MEDICINE

## 2023-02-06 PROCEDURE — 75571 CT HRT W/O DYE W/CA TEST: CPT | Mod: GA

## 2023-02-08 DIAGNOSIS — R91.8 PULMONARY NODULES: Primary | ICD-10-CM

## 2023-02-13 ENCOUNTER — PATIENT OUTREACH (OUTPATIENT)
Dept: ONCOLOGY | Facility: CLINIC | Age: 48
End: 2023-02-13
Payer: COMMERCIAL

## 2023-02-13 DIAGNOSIS — R91.8 PULMONARY NODULES: Primary | ICD-10-CM

## 2023-02-13 NOTE — PROGRESS NOTES
Review of Lung Nodule Referral by PCP.        2/3/23 CT calcium     IMPRESSION: New dominant finding of mostly solid partially  peripherally groundglass 9-11 mm left upper lobe pulmonary nodule.  Although this could be inflammatory/infectious, cannot exclude  neoplasm. PET/CT may be nonspecific but should still be considered.  Alternatively, tissue diagnosis could also be considered. Coronary  artery calcium. Please refer to separate cardiology report for  additional information.    Former smoker, quit 2011, 10 pk/yrs.       Will offer next available Lung Nodule Clinic provider (2/20/23 Maya Ralph 1:30pm) with PFTs and dedicated CT chest w/o contrast prior to visit - or next available Nodule provider per pt preference.    Scott Ware RN  Nurse Navigator  Lung Nodule Clinic  [facility] [place] [facility]  [phone]

## 2023-02-15 NOTE — TELEPHONE ENCOUNTER
RECORDS STATUS - ALL OTHER DIAGNOSIS      RECORDS RECEIVED FROM: [facility]   DATE RECEIVED: 02/15/23   NOTES STATUS DETAILS   OFFICE NOTE from referring provider Epic 02/04/23: Dr. Kareen Nunez   MEDICATION LIST [facility]    LABS     PATHOLOGY REPORTS     ANYTHING RELATED TO DIAGNOSIS Epic Most recent 02/04/23   IMAGING (NEED IMAGES & REPORT)     CT SCANS PACS 02/06/23: CT Calcium   02/12/20: CT Chest  02/12/20: CT Soft Tissue Neck

## 2023-02-20 ENCOUNTER — TELEPHONE (OUTPATIENT)
Dept: GASTROENTEROLOGY | Facility: CLINIC | Age: 48
End: 2023-02-20

## 2023-02-20 ENCOUNTER — ONCOLOGY VISIT (OUTPATIENT)
Dept: PULMONOLOGY | Facility: CLINIC | Age: 48
End: 2023-02-20
Attending: FAMILY MEDICINE
Payer: COMMERCIAL

## 2023-02-20 ENCOUNTER — ANCILLARY PROCEDURE (OUTPATIENT)
Dept: CT IMAGING | Facility: CLINIC | Age: 48
End: 2023-02-20
Attending: CLINICAL NURSE SPECIALIST
Payer: COMMERCIAL

## 2023-02-20 ENCOUNTER — PRE VISIT (OUTPATIENT)
Dept: PULMONOLOGY | Facility: CLINIC | Age: 48
End: 2023-02-20

## 2023-02-20 VITALS
DIASTOLIC BLOOD PRESSURE: 89 MMHG | HEART RATE: 60 BPM | RESPIRATION RATE: 16 BRPM | TEMPERATURE: 98 F | OXYGEN SATURATION: 100 % | BODY MASS INDEX: 28.16 KG/M2 | HEIGHT: 67 IN | SYSTOLIC BLOOD PRESSURE: 142 MMHG | WEIGHT: 179.4 LBS

## 2023-02-20 DIAGNOSIS — R91.8 PULMONARY NODULES: ICD-10-CM

## 2023-02-20 DIAGNOSIS — R91.8 PULMONARY NODULES: Primary | ICD-10-CM

## 2023-02-20 PROCEDURE — G0463 HOSPITAL OUTPT CLINIC VISIT: HCPCS | Performed by: STUDENT IN AN ORGANIZED HEALTH CARE EDUCATION/TRAINING PROGRAM

## 2023-02-20 PROCEDURE — G0463 HOSPITAL OUTPT CLINIC VISIT: HCPCS

## 2023-02-20 PROCEDURE — 71250 CT THORAX DX C-: CPT | Mod: GC | Performed by: RADIOLOGY

## 2023-02-20 PROCEDURE — 94726 PLETHYSMOGRAPHY LUNG VOLUMES: CPT | Performed by: INTERNAL MEDICINE

## 2023-02-20 PROCEDURE — 94729 DIFFUSING CAPACITY: CPT | Performed by: INTERNAL MEDICINE

## 2023-02-20 PROCEDURE — 99213 OFFICE O/P EST LOW 20 MIN: CPT | Mod: 25 | Performed by: STUDENT IN AN ORGANIZED HEALTH CARE EDUCATION/TRAINING PROGRAM

## 2023-02-20 PROCEDURE — 94375 RESPIRATORY FLOW VOLUME LOOP: CPT | Performed by: INTERNAL MEDICINE

## 2023-02-20 ASSESSMENT — PAIN SCALES - GENERAL: PAINLEVEL: NO PAIN (0)

## 2023-02-20 NOTE — NURSING NOTE
"Oncology Rooming Note    February 20, 2023 2:53 PM   Belen Arredondo is a 48 year old male who presents for:    Chief Complaint   Patient presents with     Oncology Clinic Visit     New Eval for Pulmonary Nodules     Initial Vitals: Blood Pressure (Abnormal) 142/89   Pulse 60   Temperature 98  F (36.7  C) (Oral)   Respiration 16   Height 1.71 m (5' 7.32\")   Weight 81.4 kg (179 lb 6.4 oz)   Oxygen Saturation 100%   Body Mass Index 27.83 kg/m   Estimated body mass index is 27.83 kg/m  as calculated from the following:    Height as of this encounter: 1.71 m (5' 7.32\").    Weight as of this encounter: 81.4 kg (179 lb 6.4 oz). Body surface area is 1.97 meters squared.  No Pain (0) Comment: Data Unavailable   No LMP for male patient.  Allergies reviewed: Yes  Medications reviewed: Yes    Medications: Medication refills not needed today.  Pharmacy name entered into [facility]: [facility] [id number] [place] [id number] [address]    Clinical concerns: none       Zenaida Duff MA            "

## 2023-02-20 NOTE — PROGRESS NOTES
LUNG NODULE & INTERVENTIONAL PULMONARY CLINIC  [facility], [facility]     Belen Arredondo MRN# [id number]   Age: 48 year old YOB: 1975     Reason for Consultation: Lung nodule    Requesting Physician: Kareen Nunez MD PhD  [address] [place] [address]       Assessment and Plan:    Belen Arredondo is a 48 year old male who presents with a JOANNE part solid lung nodule. Given his family history of lung cancer in his sister, I was concerned at first. However, he had a repeat CT chest this mroning and almost the entire nodule has resolved. There is still some mild ground glass area in that region, but the solid component has resolved. This is good news.     Plan:  1. Repeat CT chest in 6 months. If resolved at that time, can likely stop following.         Jc Ralph DO  Interventional Pulmonology  Department of Pulmonary, Allergy, Critical Care and Sleep Medicine   [facility]           History:     Belen Arredondo is a 48 year old male with sig h/o for tobacco abuse presents with a lung nodule. Patient had a cardiac CT which incidentaly picked up a JOANNE lung nodule that is 9mm solid component. He had a CT chest this morning and is following up with me for this. Patient is healthy. Does not take any prescription medications. 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 Denies any cough or shortness of breath. No fevers or chills. He had covid in November last year and had high fever but was never hospitalized. He is a former smoker, smoked 1ppd. Quit 10 years ago. Notable history is his sister who  of lung cancer at age 48. No know exposure history.          Past Medical History:      Past Medical History:   Diagnosis Date     Depressive disorder 2006     Jaw mass      MDD (major depressive disorder)      NO ACTIVE PROBLEMS            Past Surgical History:      Past Surgical History:   Procedure Laterality Date     none            Social History:     Social  History     Tobacco Use     Smoking status: Former     Packs/day: 1.00     Years: 10.00     Pack years: 10.00     Types: Cigarettes     Start date: 1990     Quit date: 2011     Years since quittin.0     Smokeless tobacco: Never   Substance Use Topics     Alcohol use: Yes     Comment: rare occasional beer           Family History:     Family History   Problem Relation Age of Onset     Coronary Artery Disease Father      Hyperlipidemia Father      Heart Disease Father      Lung Cancer Sister      Cancer Sister         stage 4 NSCLC     Diabetes Maternal Grandmother      Hypertension Maternal Grandmother            Allergies:      Allergies   Allergen Reactions     Penicillins Anaphylaxis     Sulfa Drugs Dermatitis          Medications:     Current Outpatient Medications   Medication Sig     sildenafil (VIAGRA) 25 MG tablet Take 2 tablets or 50mg  30 minutes to 4 hrs prior to intercourse     sildenafil (VIAGRA) 25 MG tablet Take 1 tablet (25 mg) by mouth daily as needed     terbinafine (LAMISIL) 250 MG tablet Take 1 tablet (250 mg) by mouth daily for 90 days     No current facility-administered medications for this visit.            Physical Exam:   There were no vitals taken for this visit.  Wt Readings from Last 4 Encounters:   23 80.3 kg (177 lb)   10/13/21 79.4 kg (175 lb 1.6 oz)   20 81.2 kg (179 lb)   20 82 kg (180 lb 11.2 oz)     General: Well appearing, nonlabored breathing  Heart: Did not assess.   Lungs: Did not assess.   Neuro: Answering questions appropriately  Psych: Normal affect  Skin: Warm and dry     Imaging/Lab Data   All laboratory and imaging data reviewed.    Results for orders placed or performed in visit on 23 (from the past 24 hour(s))   General PFT Lab (Please always keep checked)   Result Value Ref Range    FVC-Pred 4.21 L    FVC-Pre 4.15 L    FVC-%Pred-Pre 98 %    FEV1-Pre 3.26 L    FEV1-%Pred-Pre 96 %    FEV1FVC-Pred 80 %    FEV1FVC-Pre 78 %     FEFMax-Pred 9.27 L/sec    FEFMax-Pre 10.34 L/sec    FEFMax-%Pred-Pre 111 %    FEF2575-Pred 3.19 L/sec    FEF2575-Pre 2.83 L/sec    XAD6977-%Pred-Pre 88 %    ExpTime-Pre 7.63 sec    FIFMax-Pre 4.08 L/sec    VC-Pred 4.78 L    VC-Pre 4.24 L    VC-%Pred-Pre 88 %    IC-Pred 3.59 L    IC-Pre 3.10 L    IC-%Pred-Pre 86 %    ERV-Pred 1.19 L    ERV-Pre 1.13 L    ERV-%Pred-Pre 95 %    FEV1FEV6-Pred 80 %    FEV1FEV6-Pre 79 %    FRCPleth-Pred 3.37 L    FRCPleth-Pre 3.32 L    FRCPleth-%Pred-Pre 98 %    RVPleth-Pred 2.12 L    RVPleth-Pre 2.18 L    RVPleth-%Pred-Pre 102 %    TLCPleth-Pred 6.62 L    TLCPleth-Pre 6.42 L    TLCPleth-%Pred-Pre 96 %    DLCOunc-Pred 27.16 ml/min/mmHg    DLCOunc-Pre 25.28 ml/min/mmHg    DLCOunc-%Pred-Pre 93 %    VA-Pre 5.53 L    VA-%Pred-Pre 91 %    FEV1SVC-Pred 71 %    FEV1SVC-Pre 77 %

## 2023-02-20 NOTE — TELEPHONE ENCOUNTER
Screening Questions  BLUE  KIND OF PREP RED  LOCATION [review exclusion criteria] GREEN  SEDATION TYPE        Y Are you active on mychart?       Kareen Nunez Ordering/Referring Provider?        MEDICA What type of coverage do you have?      N Have you had a positive covid test in the last 14 days?     26.9 1. BMI  [BMI 40+ - review exclusion criteria]    Y  2. Are you able to give consent for your medical care? [IF NO,RN REVIEW]          N  3. Are you taking any prescription pain medications on a routine schedule   (ex narcotics: oxycodone, roxicodone, oxycontin,  and percocet)? [RN Review]        N  3a. EXTENDED PREP What kind of prescription?     N 4. Do you have any chemical dependencies such as alcohol, street drugs, or methadone?        **If yes 3- 5 , please schedule with MAC sedation.**          IF YES TO ANY 6 - 10 - HOSPITAL SETTING ONLY.     N 6.   Do you need assistance transferring?     N 7.   Have you had a heart or lung transplant?    N 8.   Are you currently on dialysis?   N 9.   Do you use daily home oxygen?   N 10. Do you take nitroglycerin?   10a. N If yes, how often?     11. [FEMALES]  N Are you currently pregnant?    11a. N If yes, how many weeks? [ Greater than 12 weeks, OR NEEDED]    N 12. Do you have Pulmonary Hypertension? *NEED PAC APPT AT [facility] w/ MAC*     N 13. [review exclusion criteria]  Do you have any implantable devices in your body (pacemaker, defib, LVAD)?    N 14. In the past 6 months, have you had any heart related issues including cardiomyopathy or heart attack?     14a. N If yes, did it require cardiac stenting if so when?     N 15. Have you had a stroke or Transient ischemic attack (TIA - aka  mini stroke ) within 6 months?      N 16. Do you have mod to severe Obstructive Sleep Apnea?  [Hospital only]    N 17. Do you have SEVERE AND UNCONTROLLED asthma? *NEED PAC APPT AT [facility] w/MAC*     N 18. Are you currently taking any blood thinners?     18a. If yes, inform  "patient to \"follow up w/ ordering provider for bridging instructions.\"    N 19. Do you take the medication Phentermine?    19a. If yes, \"Hold for 7 days before procedure.  Please consult your prescribing provider if you have questions about holding this medication.\"     N  20. Do you have chronic kidney disease?      N  21. Do you have a diagnosis of diabetes?     N  22. On a regular basis do you go 3-5 days between bowel movements?      23. Preferred LOCAL Pharmacy for Pre Prescription    [ LIST ONLY ONE PHARMACY]     [facility] [address] [place] [id number] [address]        - CLOSING REMINDERS -    Informed patient they will need an adult    Cannot take any type of public or medical transportation alone    Conscious Sedation- Needs  for 6 hours after the procedure       MAC/General-Needs  for 24 hours after procedure    Pre-Procedure Covid test to be completed [[facility] PCR Testing Required]    Confirmed Nurse will call to complete assessment       - SCHEDULING DETAILS -  N Hospital Setting Required? 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     MODERATE Sedation Type     N PAC / Pre-op Required                 "

## 2023-02-21 LAB
DLCOUNC-%PRED-PRE: 93 %
DLCOUNC-PRE: 25.28 ML/MIN/MMHG
DLCOUNC-PRED: 27.16 ML/MIN/MMHG
ERV-%PRED-PRE: 95 %
ERV-PRE: 1.13 L
ERV-PRED: 1.19 L
EXPTIME-PRE: 7.63 SEC
FEF2575-%PRED-PRE: 88 %
FEF2575-PRE: 2.83 L/SEC
FEF2575-PRED: 3.19 L/SEC
FEFMAX-%PRED-PRE: 111 %
FEFMAX-PRE: 10.34 L/SEC
FEFMAX-PRED: 9.27 L/SEC
FEV1-%PRED-PRE: 96 %
FEV1-PRE: 3.26 L
FEV1FEV6-PRE: 79 %
FEV1FEV6-PRED: 80 %
FEV1FVC-PRE: 78 %
FEV1FVC-PRED: 80 %
FEV1SVC-PRE: 77 %
FEV1SVC-PRED: 71 %
FIFMAX-PRE: 4.08 L/SEC
FRCPLETH-%PRED-PRE: 98 %
FRCPLETH-PRE: 3.32 L
FRCPLETH-PRED: 3.37 L
FVC-%PRED-PRE: 98 %
FVC-PRE: 4.15 L
FVC-PRED: 4.21 L
IC-%PRED-PRE: 86 %
IC-PRE: 3.1 L
IC-PRED: 3.59 L
RVPLETH-%PRED-PRE: 102 %
RVPLETH-PRE: 2.18 L
RVPLETH-PRED: 2.12 L
TLCPLETH-%PRED-PRE: 96 %
TLCPLETH-PRE: 6.42 L
TLCPLETH-PRED: 6.62 L
VA-%PRED-PRE: 91 %
VA-PRE: 5.53 L
VC-%PRED-PRE: 88 %
VC-PRE: 4.24 L
VC-PRED: 4.78 L

## 2023-04-11 ENCOUNTER — TELEPHONE (OUTPATIENT)
Dept: GASTROENTEROLOGY | Facility: CLINIC | Age: 48
End: 2023-04-11
Payer: COMMERCIAL

## 2023-04-11 DIAGNOSIS — Z12.11 ENCOUNTER FOR SCREENING COLONOSCOPY: Primary | ICD-10-CM

## 2023-04-11 RX ORDER — BISACODYL 5 MG/1
TABLET, DELAYED RELEASE ORAL
Qty: 4 TABLET | Refills: 0 | Status: SHIPPED | OUTPATIENT
Start: 2023-04-11

## 2023-04-11 NOTE — TELEPHONE ENCOUNTER
Attempted to contact patient regarding upcoming Colonoscopy  procedure on 4/24/23 for pre assessment questions. No answer.     Left message to return call to [phone] #4    Discuss Covid policy and designated  policy.    Pre op exam? N/A    Arrival time: 1400. Procedure time: 1500    [facility]; [address] [place] [address], [address] Floor, [place] [address]    Sedation type: Conscious sedation     Anticoagulants: No    Electronic implanted devices? No    Diabetic? No    Indication for procedure: screening colonoscopy    Bowel prep recommendation: Standard Golytely  d/t mg citrate recall    Prep instructions sent via HQ plus. Bowel prep script sent to [facility] [address] [place] [address] [address] RN  Endoscopy Procedure Pre Assessment RN

## 2023-04-17 NOTE — TELEPHONE ENCOUNTER
Pre assessment questions completed for upcoming Colonoscopy  procedure scheduled on 4.24.23    COVID policy reviewed.     Reviewed procedural arrival time 1400 and facility location [facility]; [address] [place] [address], [address] Floor, [place] [address]    Designated  policy reviewed. Instructed to have someone stay 6 hours post procedure.     Anticoagulation/blood thinners? No    Electronic implanted devices? No    Diabetic? No    Reviewed standard golytely procedure prep instructions.     Patient verbalized understanding and had no questions or concerns at this time.    Neela Shook RN  Endoscopy Procedure Pre Assessment RN

## 2023-04-24 ENCOUNTER — ANESTHESIA (OUTPATIENT)
Dept: SURGERY | Facility: AMBULATORY SURGERY CENTER | Age: 48
End: 2023-04-24
Payer: COMMERCIAL

## 2023-04-24 ENCOUNTER — ANESTHESIA EVENT (OUTPATIENT)
Dept: SURGERY | Facility: AMBULATORY SURGERY CENTER | Age: 48
End: 2023-04-24
Payer: COMMERCIAL

## 2023-04-24 ENCOUNTER — HOSPITAL ENCOUNTER (OUTPATIENT)
Facility: AMBULATORY SURGERY CENTER | Age: 48
Discharge: HOME | End: 2023-04-24
Attending: INTERNAL MEDICINE | Admitting: INTERNAL MEDICINE
Payer: COMMERCIAL

## 2023-04-24 VITALS
HEART RATE: 59 BPM | BODY MASS INDEX: 24.71 KG/M2 | RESPIRATION RATE: 16 BRPM | WEIGHT: 163 LBS | TEMPERATURE: 97 F | OXYGEN SATURATION: 95 % | DIASTOLIC BLOOD PRESSURE: 80 MMHG | HEIGHT: 68 IN | SYSTOLIC BLOOD PRESSURE: 114 MMHG

## 2023-04-24 VITALS — HEART RATE: 64 BPM

## 2023-04-24 LAB — COLONOSCOPY: NORMAL

## 2023-04-24 PROCEDURE — 88305 TISSUE EXAM BY PATHOLOGIST: CPT | Mod: 26 | Performed by: PATHOLOGY

## 2023-04-24 PROCEDURE — 45380 COLONOSCOPY AND BIOPSY: CPT | Mod: 33

## 2023-04-24 PROCEDURE — 88305 TISSUE EXAM BY PATHOLOGIST: CPT | Mod: TC | Performed by: INTERNAL MEDICINE

## 2023-04-24 RX ORDER — HYDROMORPHONE HYDROCHLORIDE 1 MG/ML
0.2 INJECTION, SOLUTION INTRAMUSCULAR; INTRAVENOUS; SUBCUTANEOUS EVERY 5 MIN PRN
Status: DISCONTINUED | OUTPATIENT
Start: 2023-04-24 | End: 2023-04-24 | Stop reason: HOSPADM

## 2023-04-24 RX ORDER — PROCHLORPERAZINE MALEATE 10 MG
10 TABLET ORAL EVERY 6 HOURS PRN
Status: DISCONTINUED | OUTPATIENT
Start: 2023-04-24 | End: 2023-04-25 | Stop reason: HOSPADM

## 2023-04-24 RX ORDER — SODIUM CHLORIDE, SODIUM LACTATE, POTASSIUM CHLORIDE, CALCIUM CHLORIDE 600; 310; 30; 20 MG/100ML; MG/100ML; MG/100ML; MG/100ML
INJECTION, SOLUTION INTRAVENOUS CONTINUOUS PRN
Status: DISCONTINUED | OUTPATIENT
Start: 2023-04-24 | End: 2023-04-24

## 2023-04-24 RX ORDER — PROPOFOL 10 MG/ML
INJECTION, EMULSION INTRAVENOUS CONTINUOUS PRN
Status: DISCONTINUED | OUTPATIENT
Start: 2023-04-24 | End: 2023-04-24

## 2023-04-24 RX ORDER — FLUMAZENIL 0.1 MG/ML
0.2 INJECTION, SOLUTION INTRAVENOUS
Status: DISCONTINUED | OUTPATIENT
Start: 2023-04-24 | End: 2023-04-25 | Stop reason: HOSPADM

## 2023-04-24 RX ORDER — ONDANSETRON 2 MG/ML
4 INJECTION INTRAMUSCULAR; INTRAVENOUS EVERY 30 MIN PRN
Status: DISCONTINUED | OUTPATIENT
Start: 2023-04-24 | End: 2023-04-24 | Stop reason: HOSPADM

## 2023-04-24 RX ORDER — NALOXONE HYDROCHLORIDE 0.4 MG/ML
0.2 INJECTION, SOLUTION INTRAMUSCULAR; INTRAVENOUS; SUBCUTANEOUS
Status: DISCONTINUED | OUTPATIENT
Start: 2023-04-24 | End: 2023-04-25 | Stop reason: HOSPADM

## 2023-04-24 RX ORDER — HYDROMORPHONE HYDROCHLORIDE 1 MG/ML
0.4 INJECTION, SOLUTION INTRAMUSCULAR; INTRAVENOUS; SUBCUTANEOUS EVERY 5 MIN PRN
Status: DISCONTINUED | OUTPATIENT
Start: 2023-04-24 | End: 2023-04-24 | Stop reason: HOSPADM

## 2023-04-24 RX ORDER — NALOXONE HYDROCHLORIDE 0.4 MG/ML
0.4 INJECTION, SOLUTION INTRAMUSCULAR; INTRAVENOUS; SUBCUTANEOUS
Status: DISCONTINUED | OUTPATIENT
Start: 2023-04-24 | End: 2023-04-25 | Stop reason: HOSPADM

## 2023-04-24 RX ORDER — ONDANSETRON 4 MG/1
4 TABLET, ORALLY DISINTEGRATING ORAL EVERY 30 MIN PRN
Status: DISCONTINUED | OUTPATIENT
Start: 2023-04-24 | End: 2023-04-24 | Stop reason: HOSPADM

## 2023-04-24 RX ORDER — SODIUM CHLORIDE, SODIUM LACTATE, POTASSIUM CHLORIDE, CALCIUM CHLORIDE 600; 310; 30; 20 MG/100ML; MG/100ML; MG/100ML; MG/100ML
INJECTION, SOLUTION INTRAVENOUS CONTINUOUS
Status: DISCONTINUED | OUTPATIENT
Start: 2023-04-24 | End: 2023-04-24 | Stop reason: HOSPADM

## 2023-04-24 RX ORDER — FENTANYL CITRATE 50 UG/ML
25 INJECTION, SOLUTION INTRAMUSCULAR; INTRAVENOUS EVERY 5 MIN PRN
Status: DISCONTINUED | OUTPATIENT
Start: 2023-04-24 | End: 2023-04-24 | Stop reason: HOSPADM

## 2023-04-24 RX ORDER — FENTANYL CITRATE 50 UG/ML
50 INJECTION, SOLUTION INTRAMUSCULAR; INTRAVENOUS EVERY 5 MIN PRN
Status: DISCONTINUED | OUTPATIENT
Start: 2023-04-24 | End: 2023-04-24 | Stop reason: HOSPADM

## 2023-04-24 RX ORDER — ONDANSETRON 2 MG/ML
4 INJECTION INTRAMUSCULAR; INTRAVENOUS EVERY 6 HOURS PRN
Status: DISCONTINUED | OUTPATIENT
Start: 2023-04-24 | End: 2023-04-25 | Stop reason: HOSPADM

## 2023-04-24 RX ORDER — LIDOCAINE 40 MG/G
CREAM TOPICAL
Status: DISCONTINUED | OUTPATIENT
Start: 2023-04-24 | End: 2023-04-24 | Stop reason: HOSPADM

## 2023-04-24 RX ORDER — ONDANSETRON 4 MG/1
4 TABLET, ORALLY DISINTEGRATING ORAL EVERY 6 HOURS PRN
Status: DISCONTINUED | OUTPATIENT
Start: 2023-04-24 | End: 2023-04-25 | Stop reason: HOSPADM

## 2023-04-24 RX ORDER — PROPOFOL 10 MG/ML
INJECTION, EMULSION INTRAVENOUS PRN
Status: DISCONTINUED | OUTPATIENT
Start: 2023-04-24 | End: 2023-04-24

## 2023-04-24 RX ORDER — ONDANSETRON 2 MG/ML
4 INJECTION INTRAMUSCULAR; INTRAVENOUS
Status: DISCONTINUED | OUTPATIENT
Start: 2023-04-24 | End: 2023-04-24 | Stop reason: HOSPADM

## 2023-04-24 RX ADMIN — PROPOFOL 150 MCG/KG/MIN: 10 INJECTION, EMULSION INTRAVENOUS at 14:09

## 2023-04-24 RX ADMIN — PROPOFOL 200 MCG/KG/MIN: 10 INJECTION, EMULSION INTRAVENOUS at 13:55

## 2023-04-24 RX ADMIN — PROPOFOL 50 MG: 10 INJECTION, EMULSION INTRAVENOUS at 13:55

## 2023-04-24 RX ADMIN — SODIUM CHLORIDE, SODIUM LACTATE, POTASSIUM CHLORIDE, CALCIUM CHLORIDE: 600; 310; 30; 20 INJECTION, SOLUTION INTRAVENOUS at 13:17

## 2023-04-24 NOTE — ANESTHESIA CARE TRANSFER NOTE
Patient: Belen Arredondo    Procedure: Procedure(s):  COLONOSCOPY, WITH POLYPECTOMY       Diagnosis: Annual physical exam [Z00.00]  Diagnosis Additional Information: No value filed.    Anesthesia Type:   MAC     Note:    Oropharynx: oropharynx clear of all foreign objects and spontaneously breathing  Level of Consciousness: drowsy  Oxygen Supplementation: room air    Independent Airway: airway patency satisfactory and stable  Dentition: dentition unchanged  Vital Signs Stable: post-procedure vital signs reviewed and stable  Report to RN Given: handoff report given  Patient transferred to: Phase II    Handoff Report: Identifed the Patient, Identified the Reponsible Provider, Reviewed the pertinent medical history, Discussed the surgical course, Reviewed Intra-OP anesthesia mangement and issues during anesthesia, Set expectations for post-procedure period and Allowed opportunity for questions and acknowledgement of understanding      Vitals:  Vitals Value Taken Time   BP     Temp     Pulse     Resp     SpO2         Electronically Signed By: VIBHA Valdez CRNA  April 24, 2023  2:23 PM

## 2023-04-24 NOTE — ANESTHESIA PREPROCEDURE EVALUATION
Anesthesia Pre-Procedure Evaluation    Patient: Belen Arredondo   MRN: [id number] : 1975        Procedure : Procedure(s):  COLONOSCOPY          Past Medical History:   Diagnosis Date     Depressive disorder 2006     Jaw mass      MDD (major depressive disorder)      NO ACTIVE PROBLEMS       Past Surgical History:   Procedure Laterality Date     none        Allergies   Allergen Reactions     Penicillins Anaphylaxis     Sulfa Drugs Dermatitis      Social History     Tobacco Use     Smoking status: Former     Packs/day: 1.00     Years: 10.00     Pack years: 10.00     Types: Cigarettes     Start date: 1990     Quit date: 2011     Years since quittin.2     Smokeless tobacco: Never   Vaping Use     Vaping status: Not on file   Substance Use Topics     Alcohol use: Yes     Comment: rare occasional beer       Wt Readings from Last 1 Encounters:   23 73.9 kg (163 lb)        Anesthesia Evaluation   Pt has not had prior anesthetic         ROS/MED HX  ENT/Pulmonary:  - neg pulmonary ROS     Neurologic:  - neg neurologic ROS     Cardiovascular:  - neg cardiovascular ROS     METS/Exercise Tolerance: >4 METS    Hematologic:  - neg hematologic  ROS     Musculoskeletal:  - neg musculoskeletal ROS     GI/Hepatic:  - neg GI/hepatic ROS     Renal/Genitourinary:  - neg Renal ROS     Endo:  - neg endo ROS     Psychiatric/Substance Use:  - neg psychiatric ROS     Infectious Disease:  - neg infectious disease ROS     Malignancy:       Other:            Physical Exam    Airway  airway exam normal      Mallampati: I   TM distance: > 3 FB   Neck ROM: full   Mouth opening: > 3 cm    Respiratory Devices and Support         Dental       (+) Completely normal teeth      Cardiovascular   cardiovascular exam normal          Pulmonary   pulmonary exam normal                OUTSIDE LABS:  CBC:   Lab Results   Component Value Date    WBC 3.4 (L) 2008    HGB 14.8 2020    HGB 14.1 10/16/2013    HCT 41.3 2008      02/06/2008     BMP:   Lab Results   Component Value Date     02/27/2020     10/16/2013    POTASSIUM 4.0 02/27/2020    POTASSIUM 4.1 10/16/2013    CHLORIDE 108 02/27/2020    CHLORIDE 107 10/16/2013    CO2 25 02/27/2020    CO2 24 10/16/2013    BUN 21 02/27/2020    BUN 20 10/16/2013    CR 0.80 02/27/2020    CR 0.83 10/16/2013    GLC 96 02/27/2020    GLC 91 10/16/2013     COAGS: No results found for: PTT, INR, FIBR  POC: No results found for: BGM, HCG, HCGS  HEPATIC:   Lab Results   Component Value Date    ALBUMIN 4.9 02/04/2023    PROTTOTAL 7.6 02/04/2023    ALT 29 02/04/2023    AST 32 02/04/2023    ALKPHOS 74 02/04/2023    BILITOTAL 0.3 02/04/2023     OTHER:   Lab Results   Component Value Date    ELIOT 9.3 02/27/2020    TSH 2.61 02/27/2020       Anesthesia Plan    ASA Status:  1   NPO Status:  NPO Appropriate    Anesthesia Type: MAC.     - Reason for MAC: straight local not clinically adequate              Consents    Anesthesia Plan(s) and associated risks, benefits, and realistic alternatives discussed. Questions answered and patient/representative(s) expressed understanding.     - Discussed: Risks, Benefits and Alternatives for the PROCEDURE were discussed     - Discussed with:  Patient      - Extended Intubation/Ventilatory Support Discussed: No.      - Patient is DNR/DNI Status: No    Use of blood products discussed: No .     Postoperative Care            Comments:                Haile Nelson MD

## 2023-04-24 NOTE — H&P
Belen Arredondo  [id number]  male  48 year old      Reason for procedure/surgery: colon cancer screening    Patient Active Problem List   Diagnosis     Folliculitis     Major depressive disorder, recurrent episode, moderate (H)       Past Surgical History:    Past Surgical History:   Procedure Laterality Date     none         Past Medical History:   Past Medical History:   Diagnosis Date     Depressive disorder 2006     Jaw mass      MDD (major depressive disorder)      NO ACTIVE PROBLEMS        Social History:   Social History     Tobacco Use     Smoking status: Former     Packs/day: 1.00     Years: 10.00     Pack years: 10.00     Types: Cigarettes     Start date: 1990     Quit date: 2011     Years since quittin.2     Smokeless tobacco: Never   Vaping Use     Vaping status: Not on file   Substance Use Topics     Alcohol use: Yes     Comment: rare occasional beer        Family History:   Family History   Problem Relation Age of Onset     Coronary Artery Disease Father      Hyperlipidemia Father      Heart Disease Father      Lung Cancer Sister      Cancer Sister         stage 4 NSCLC     Diabetes Maternal Grandmother      Hypertension Maternal Grandmother        Allergies:   Allergies   Allergen Reactions     Penicillins Anaphylaxis     Sulfa Drugs Dermatitis       Active Medications:   Current Outpatient Medications   Medication Sig Dispense Refill     sildenafil (VIAGRA) 25 MG tablet Take 1 tablet (25 mg) by mouth daily as needed 15 tablet 1     terbinafine (LAMISIL) 250 MG tablet Take 1 tablet (250 mg) by mouth daily for 90 days 90 tablet 0     bisacodyl (DULCOLAX) 5 MG EC tablet Take 2 tablets at 3 pm the day before your procedure. If your procedure is before 11 am, take 2 additional tablets at 11 pm. If your procedure is after 11 am, take 2 additional tablets at 6 am. For additional instructions refer to your colonoscopy prep instructions. 4 tablet 0     polyethylene glycol (GOLYTELY) 236 g  "suspension The night before the exam at 6 pm drink an 8-ounce glass every 15 minutes until the jug is half empty. If you arrive before 11 AM: Drink the other half of the Golytely jug at 11 PM night before procedure. If you arrive after 11 AM: Drink the other half of the Golytely jug at 6 AM day of procedure. For additional instructions refer to your colonoscopy prep instructions. 4000 mL 0     sildenafil (VIAGRA) 25 MG tablet Take 2 tablets or 50mg  30 minutes to 4 hrs prior to intercourse 25 tablet 1       Systemic Review:   CONSTITUTIONAL: NEGATIVE for fever, chills, change in weight  ENT/MOUTH: NEGATIVE for ear, mouth and throat problems  RESP: NEGATIVE for significant cough or SOB  CV: NEGATIVE for chest pain, palpitations or peripheral edema    Physical Examination:   Vital Signs: /80 (BP Location: Right arm)   Pulse 59   Temp 97.5  F (36.4  C) (Temporal)   Resp 18   Ht 1.727 m (5' 8\")   Wt 73.9 kg (163 lb)   SpO2 99%   BMI 24.78 kg/m    GENERAL: healthy, alert and no distress  NECK: no adenopathy, no asymmetry, masses, or scars  RESP: lungs clear to auscultation - no rales, rhonchi or wheezes  CV: regular rate and rhythm, normal S1 S2, no S3 or S4, no murmur, click or rub, no peripheral edema and peripheral pulses strong  ABDOMEN: soft, nontender, no hepatosplenomegaly, no masses and bowel sounds normal  MS: no gross musculoskeletal defects noted, no edema    Plan: Appropriate to proceed as scheduled.      Alireza Hamilton MD  4/24/2023    PCP:  Kareen Nunez    " Patent

## 2023-04-25 LAB
PATH REPORT.COMMENTS IMP SPEC: NORMAL
PATH REPORT.COMMENTS IMP SPEC: NORMAL
PATH REPORT.FINAL DX SPEC: NORMAL
PATH REPORT.GROSS SPEC: NORMAL
PATH REPORT.MICROSCOPIC SPEC OTHER STN: NORMAL
PATH REPORT.RELEVANT HX SPEC: NORMAL
PHOTO IMAGE: NORMAL

## 2023-05-10 DIAGNOSIS — B35.1 FUNGAL TOENAIL INFECTION: ICD-10-CM

## 2023-05-15 RX ORDER — TERBINAFINE HYDROCHLORIDE 250 MG/1
250 TABLET ORAL DAILY
Qty: 90 TABLET | Refills: 0 | OUTPATIENT
Start: 2023-05-15 | End: 2023-08-13

## 2023-05-15 NOTE — TELEPHONE ENCOUNTER
TERBINAFINE  MG TABLET      Last Written Prescription Date:  2/4/23-5/5/23  Last Fill Quantity: 90,   # refills: 0  Last Office Visit : 2/4/23  Future Office visit:  none    Routing refill request to provider for review/approval because:  Drug not on the [facility], P or [facility] refill protocol or controlled substance  Drug is not active on patient's med list

## 2023-05-22 DIAGNOSIS — B35.1 FUNGAL TOENAIL INFECTION: ICD-10-CM

## 2023-05-22 DIAGNOSIS — B35.1 FUNGAL TOENAIL INFECTION: Primary | ICD-10-CM

## 2023-05-22 RX ORDER — TERBINAFINE HYDROCHLORIDE 250 MG/1
250 TABLET ORAL DAILY
Qty: 90 TABLET | Refills: 0 | Status: SHIPPED | OUTPATIENT
Start: 2023-05-22 | End: 2023-08-21

## 2023-05-23 NOTE — PROGRESS NOTES
5/22/23  Terbinafine 250mg tablet - 1/day for 3 months was refilled.  The solution for nails was discontinued.  Kareen Nunez MD, PhD

## 2023-07-12 ENCOUNTER — OFFICE VISIT (OUTPATIENT)
Dept: DERMATOLOGY | Facility: CLINIC | Age: 48
End: 2023-07-12
Payer: COMMERCIAL

## 2023-07-12 DIAGNOSIS — L82.1 SEBORRHEIC KERATOSES: Primary | ICD-10-CM

## 2023-07-12 DIAGNOSIS — L73.8 SENILE SEBACEOUS GLAND HYPERPLASIA: ICD-10-CM

## 2023-07-12 DIAGNOSIS — Z12.83 SKIN CANCER SCREENING: ICD-10-CM

## 2023-07-12 DIAGNOSIS — D22.30 MELANOCYTIC NEVUS OF FACE: ICD-10-CM

## 2023-07-12 DIAGNOSIS — D18.01 CHERRY ANGIOMA: ICD-10-CM

## 2023-07-12 PROCEDURE — 99203 OFFICE O/P NEW LOW 30 MIN: CPT | Performed by: PHYSICIAN ASSISTANT

## 2023-07-12 ASSESSMENT — PAIN SCALES - GENERAL: PAINLEVEL: NO PAIN (0)

## 2023-07-12 NOTE — LETTER
7/12/2023       RE: Belen Arredondo  [address]  [place] [address]     Dear Colleague,    Thank you for referring your patient, Belen Arredondo, to the [facility] [place] at [facility]. Please see a copy of my visit note below.    [facility] Dermatology Note  Encounter Date: Jul 12, 2023  Office Visit     Dermatology Problem List:  1.  Skin cancer screening  Cherry angiomas, sebaceous hyperplasia, seborrheic keratoses    ____________________________________________    Assessment & Plan:     #Skin cancer screening with benign findings including seborrheic keratoses, cherry angiomas, a few benign nevi, mainly on the face and sebaceous hyperplasia.  - ABCDEs: Counseled ABCDEs of melanoma: Asymmetry, Border (irregularity), Color (not uniform, changes in color), Diameter (greater than 6 mm which is about the size of a pencil eraser), and Evolving (any changes in preexisting moles).  - Sun protection: Counseled SPF30+ sunscreen, UPF clothing, sun avoidance, tanning bed avoidance.     Reassurance regarding seborrheic keratoses and cherry angiomas given.  Discussed benign nature and no further treatment is required.  Procedures Performed:   None    Follow-up: prn for new or changing lesions    Staff:     All risk, benefits and alternatives were discussed with patient.  Patient is in agreement and understands the assessment and plan.  All questions were answered.  Sun Screen Education was given.   Return to Clinic as needed.   Zenaida Perez PA-C   ____________________________________________    CC: Derm Problem (Patient has spots that are growing size on the face. Patent also reports some cherry angioma on chest increasing in size. )    HPI:  Mr. Belen Arredondo is a(n) 48 year old male who presents today as a new patient for a skin check with concerns of growths on the face.  He feels like these look similar to warts and wants to know  if these need to be treated.  Additionally he has some growing red bumps on his chest.  He he states these have been there for numerous years and are asymptomatic.  None of the spots on his skin are painful, bleeding, not healing, growing rapidly or dark black.  He rarely has sun exposure.  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    Patient is otherwise feeling well, without additional skin concerns.     Labs Reviewed:  N/A    Physical Exam:  Vitals: There were no vitals taken for this visit.  SKIN: Total skin excluding the undergarment areas was performed. The exam included the head/face, neck, both arms, chest, back, abdomen, both legs, digits and/or nails.     - There are dome shaped bright red papules on the trunk, mainly the chest and upper abdomen.   A few regular brown pigmented macules and papules are identified on the face   There are yellow oily papules with central umbilication located on the forehead  There are waxy stuck on tan to brown papules on the face and trunk..   - No other lesions of concern on areas examined.     Medications:  Current Outpatient Medications   Medication    bisacodyl (DULCOLAX) 5 MG EC tablet    polyethylene glycol (GOLYTELY) 236 g suspension    sildenafil (VIAGRA) 25 MG tablet    terbinafine (LAMISIL) 250 MG tablet     No current facility-administered medications for this visit.      Past Medical History:   Patient Active Problem List   Diagnosis    Folliculitis    Major depressive disorder, recurrent episode, moderate (H)     Past Medical History:   Diagnosis Date    Depressive disorder Jan 2006    Jaw mass     MDD (major depressive disorder)     NO ACTIVE PROBLEMS        CC No referring provider defined for this encounter. on close of this encounter.

## 2023-07-12 NOTE — PATIENT INSTRUCTIONS
Patient Education     Checking for Skin Cancer  You can find cancer early by checking your skin each month. There are 3 kinds of skin cancer. They are melanoma, basal cell carcinoma, and squamous cell carcinoma. Doing monthly skin checks is the best way to find new marks or skin changes. Follow the instructions below for checking your skin.   The ABCDEs of checking moles for melanoma   Check your moles or growths for signs of melanoma using ABCDE:   Asymmetry: the sides of the mole or growth don t match  Border: the edges are ragged, notched, or blurred  Color: the color within the mole or growth varies  Diameter: the mole or growth is larger than 6 mm (size of a pencil eraser)  Evolving: the size, shape, or color of the mole or growth is changing (evolving is not shown in the images below)    Checking for other types of skin cancer  Basal cell carcinoma or squamous cell carcinoma have symptoms such as:     A spot or mole that looks different from all other marks on your skin  Changes in how an area feels, such as itching, tenderness, or pain  Changes in the skin's surface, such as oozing, bleeding, or scaliness  A sore that does not heal  New swelling or redness beyond the border of a mole    Who s at risk?  Anyone can get skin cancer. But you are at greater risk if you have:   Fair skin, light-colored hair, or light-colored eyes  Many moles or abnormal moles on your skin  A history of sunburns from sunlight or tanning beds  A family history of skin cancer  A history of exposure to radiation or chemicals  A weakened immune system  If you have had skin cancer in the past, you are at risk for recurring skin cancer.   How to check your skin  Do your monthly skin checkups in front of a full-length mirror. Check all parts of your body, including your:   Head (ears, face, neck, and scalp)  Torso (front, back, and sides)  Arms (tops, undersides, upper, and lower armpits)  Hands (palms, backs, and fingers, including  under the nails)  Buttocks and genitals  Legs (front, back, and sides)  Feet (tops, soles, toes, including under the nails, and between toes)  If you have a lot of moles, take digital photos of them each month. Make sure to take photos both up close and from a distance. These can help you see if any moles change over time.   Most skin changes are not cancer. But if you see any changes in your skin, call your doctor right away. Only he or she can diagnose a problem. If you have skin cancer, seeing your doctor can be the first step toward getting the treatment that could save your life.   [employer] last reviewed this educational content on 4/1/2019 2000-2020 [employer]. [address] [place] [address], [place] [address]. All rights reserved. This information is not intended as a substitute for professional medical care. Always follow your healthcare professional's instructions.       When should I call my doctor?  If you are worsening or not improving, please, contact us or seek urgent care as noted below.     Who should I call with questions (adults)?  [facility] (adult and pediatric): [phone]  [facility] (adult): [phone]  For urgent needs outside of business hours call the [facility] at [phone] and ask for the dermatology resident on call to be paged  If this is a medical emergency and you are unable to reach an ER, Call [phone]    Who should I call with questions (pediatric)?  [facility]- Pediatric Dermatology  Dr. Daysi Hairston, Dr. Rosalie Flores, Dr. Esther Rodriguez, SHAKIR Vasquez, Dr. Maria Esther Valladares, Dr. Abbi Martinez & Dr. Mazin Talley  Non-urgent nurse triage line; [phone]- Jess and Brittaney FRANCIS Care Coordinatorkavita Romero (/Complex ) [phone]    If you need a prescription refill, please contact your pharmacy. Refills are approved or denied by our  Physicians during normal business hours, Monday through Fridays  Per office policy, refills will not be granted if you have not been seen within the past year (or sooner depending on your child's condition)    Scheduling Information:  Pediatric Appointment Scheduling and Call Center [phone]  Radiology Scheduling- [phone]  Sedation Unit Scheduling- [phone]  [facility] Scheduling- General [phone]; Pediatric Dermatology [phone]  Main  Services: [phone]  Hebrew: [phone]  Qatari: [phone]  Hmong/Ethiopian/Syriac: [phone]  Preadmission Nursing Department Fax Number: [phone] (Fax all pre-operative paperwork to this number)    For urgent matters arising during evenings, weekends, or holidays that cannot wait for normal business hours please call [phone] and ask for the dermatology resident on call to be paged.       Seborrheic Keratoses Overview    Seborrheic keratoses are common benign growths of unknown cause seen in adults   due to a thickening of an area of the top skin layer.    Who's At Risk  Although they can occur anytime after puberty, almost everyone over 50 has one or more of these and they increase in number with age. Some families have an inherited tendency to grow multiple lesions. Men and women are equally as likely to develop seborrheic keratoses. Dark-skinned people are less affected than those with light skin; a variant seen in blacks is called dermatosis papulosa nigra.    Signs & Symptoms  One or more spots can occur anywhere on the body, except for palms, soles, and mucous membranes (eg, in the mouth or rectum). They do not go away. They do not turn into cancers, but some cancers resemble seborrheic keratosis.    They start as light brown to skin-colored, flat areas, which are round to oval and of varying size (usually less than a half inch, but sometimes much larger). As they grow thicker and rise above the skin surface, seborrheic  "keratoses may become dark brown to almost black with a \"stuck on\" appearance. The surface may feel smooth or rough.    Self-Care Guidelines  No treatment is needed unless there is irritation from clothing with itching or bleeding.  There is no way to prevent new spots from forming.  Some lotions with alpha hydroxyl acids may make the areas feel smoother with regular use but will not eliminate them.  OTC freezing techniques are available but usually not effective.    When to Seek Medical Care  If a spot on the skin is growing, bleeding, painful, or itchy, see your doctor.    Spots can be removed if you don't want them, but removal is considered a cosmetic issue and is usually not covered by insurance.    Treatments Your Physician May Prescribe  Removal can be accomplished with freezing (cryosurgery), scraping (curettage), burning (electrocautery), lasers, or with acids. The doctor might conduct a biopsy if the growth looks unusual.    References:  Demetrius Campos, ed. Dermatology, pp.8084-5663. [place]: [employer], 2003.    Mihir Vasquez, ed. Mac's Dermatology in General Medicine. 6th ed, pp.767-770. [place]: [employer], 2003.   "

## 2023-07-12 NOTE — PROGRESS NOTES
[facility] Dermatology Note  Encounter Date: Jul 12, 2023  Office Visit     Dermatology Problem List:  1.  Skin cancer screening  Cherry angiomas, sebaceous hyperplasia, seborrheic keratoses    ____________________________________________    Assessment & Plan:     #Skin cancer screening with benign findings including seborrheic keratoses, cherry angiomas, a few benign nevi, mainly on the face and sebaceous hyperplasia.  - ABCDEs: Counseled ABCDEs of melanoma: Asymmetry, Border (irregularity), Color (not uniform, changes in color), Diameter (greater than 6 mm which is about the size of a pencil eraser), and Evolving (any changes in preexisting moles).  - Sun protection: Counseled SPF30+ sunscreen, UPF clothing, sun avoidance, tanning bed avoidance.     Reassurance regarding seborrheic keratoses and cherry angiomas given.  Discussed benign nature and no further treatment is required.  Procedures Performed:   None    Follow-up: prn for new or changing lesions    Staff:     All risk, benefits and alternatives were discussed with patient.  Patient is in agreement and understands the assessment and plan.  All questions were answered.  Sun Screen Education was given.   Return to Clinic as needed.   Zenaida Perez PA-C   ____________________________________________    CC: Derm Problem (Patient has spots that are growing size on the face. Patent also reports some cherry angioma on chest increasing in size. )    HPI:  Mr. Belen Arredondo is a(n) 48 year old male who presents today as a new patient for a skin check with concerns of growths on the face.  He feels like these look similar to warts and wants to know if these need to be treated.  Additionally he has some growing red bumps on his chest.  He he states these have been there for numerous years and are asymptomatic.  None of the spots on his skin are painful, bleeding, not healing, growing rapidly or dark black.  He rarely has sun exposure.  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    Patient is otherwise feeling well, without additional skin concerns.     Labs Reviewed:  N/A    Physical Exam:  Vitals: There were no vitals taken for this visit.  SKIN: Total skin excluding the undergarment areas was performed. The exam included the head/face, neck, both arms, chest, back, abdomen, both legs, digits and/or nails.     - There are dome shaped bright red papules on the trunk, mainly the chest and upper abdomen.   A few regular brown pigmented macules and papules are identified on the face   There are yellow oily papules with central umbilication located on the forehead  There are waxy stuck on tan to brown papules on the face and trunk..   - No other lesions of concern on areas examined.     Medications:  Current Outpatient Medications   Medication     bisacodyl (DULCOLAX) 5 MG EC tablet     polyethylene glycol (GOLYTELY) 236 g suspension     sildenafil (VIAGRA) 25 MG tablet     terbinafine (LAMISIL) 250 MG tablet     No current facility-administered medications for this visit.      Past Medical History:   Patient Active Problem List   Diagnosis     Folliculitis     Major depressive disorder, recurrent episode, moderate (H)     Past Medical History:   Diagnosis Date     Depressive disorder Jan 2006     Jaw mass      MDD (major depressive disorder)      NO ACTIVE PROBLEMS        CC No referring provider defined for this encounter. on close of this encounter.

## 2023-07-12 NOTE — NURSING NOTE
Dermatology Rooming Note    Belen Arredondo's goals for this visit include:   Chief Complaint   Patient presents with     Derm Problem     Patient has spots that are growing size on the face. Patent also reports some cherry angioma on chest increasing in size.      Terrie Bettencourt

## 2023-08-17 ENCOUNTER — ONCOLOGY VISIT (OUTPATIENT)
Dept: PULMONOLOGY | Facility: CLINIC | Age: 48
End: 2023-08-17
Attending: STUDENT IN AN ORGANIZED HEALTH CARE EDUCATION/TRAINING PROGRAM
Payer: COMMERCIAL

## 2023-08-17 ENCOUNTER — ANCILLARY PROCEDURE (OUTPATIENT)
Dept: CT IMAGING | Facility: CLINIC | Age: 48
End: 2023-08-17
Attending: STUDENT IN AN ORGANIZED HEALTH CARE EDUCATION/TRAINING PROGRAM
Payer: COMMERCIAL

## 2023-08-17 VITALS
WEIGHT: 172.6 LBS | RESPIRATION RATE: 16 BRPM | DIASTOLIC BLOOD PRESSURE: 76 MMHG | OXYGEN SATURATION: 96 % | TEMPERATURE: 97.9 F | HEART RATE: 60 BPM | BODY MASS INDEX: 26.24 KG/M2 | SYSTOLIC BLOOD PRESSURE: 118 MMHG

## 2023-08-17 DIAGNOSIS — R91.1 LUNG NODULE: Primary | ICD-10-CM

## 2023-08-17 DIAGNOSIS — B35.1 FUNGAL TOENAIL INFECTION: ICD-10-CM

## 2023-08-17 DIAGNOSIS — R91.8 PULMONARY NODULES: ICD-10-CM

## 2023-08-17 PROCEDURE — 99214 OFFICE O/P EST MOD 30 MIN: CPT | Performed by: STUDENT IN AN ORGANIZED HEALTH CARE EDUCATION/TRAINING PROGRAM

## 2023-08-17 PROCEDURE — 71250 CT THORAX DX C-: CPT | Mod: GC | Performed by: RADIOLOGY

## 2023-08-17 PROCEDURE — G0463 HOSPITAL OUTPT CLINIC VISIT: HCPCS | Performed by: STUDENT IN AN ORGANIZED HEALTH CARE EDUCATION/TRAINING PROGRAM

## 2023-08-17 ASSESSMENT — PAIN SCALES - GENERAL: PAINLEVEL: NO PAIN (0)

## 2023-08-17 NOTE — NURSING NOTE
"Oncology Rooming Note    August 17, 2023 8:46 AM   Belen Arredondo is a 48 year old male who presents for:    Chief Complaint   Patient presents with    Oncology Clinic Visit     Pt is here for a rtn for Folliculitis     Initial Vitals: Blood Pressure 118/76   Pulse 60   Temperature 97.9  F (36.6  C) (Oral)   Respiration 16   Weight 78.3 kg (172 lb 9.6 oz)   Oxygen Saturation 96%   Body Mass Index 26.24 kg/m   Estimated body mass index is 26.24 kg/m  as calculated from the following:    Height as of 4/24/23: 1.727 m (5' 8\").    Weight as of this encounter: 78.3 kg (172 lb 9.6 oz). Body surface area is 1.94 meters squared.  No Pain (0) Comment: Data Unavailable   No LMP for male patient.  Allergies reviewed: Yes  Medications reviewed: Yes    Medications: Medication refills not needed today.  Pharmacy name entered into [facility]: [id number] [place] [id number] [address]     Clinical concerns: none       Zenaida Duff MA             "

## 2023-08-17 NOTE — PROGRESS NOTES
LUNG NODULE & INTERVENTIONAL PULMONARY CLINIC  [facility]    Belen Arredondo MRN# [id number]   Age: 48 year old YOB: 1975     Reason for Consultation: Lung nodule    Requesting Physician: Jc Ralph DO  [address] [place] [address]     Assessment and Plan:    Belen Arredondo is a 48 year old male who presents for evaluation of lung nodule.    I reviewed their CT scan which reveals complete resolution of his GGO seen in the JOANNE previously.     Likely mucous or atelectasis. No need to follow up now unless new symptoms like cough or sob develop.       Patient indicated understanding and agreed to the plan of care. All questions answered.     Jc Ralph DO  Interventional Pulmonology  Department of Pulmonary, Allergy, Critical Care and Sleep Medicine   [facility]     History:    Belen Arredondo is a 48 year old male who presents for evaluation of a JOANNE ground glass nodule that was picked up on calcium CT of his heart. It was partly resolved on repeat imaging in February and seems to be resolved on todays CT. He has no new symptoms or cough or sob. Otherwise feeling really well.       Medications:  Current Outpatient Medications   Medication Sig    sildenafil (VIAGRA) 25 MG tablet Take 2 tablets or 50mg  30 minutes to 4 hrs prior to intercourse    terbinafine (LAMISIL) 250 MG tablet Take 1 tablet (250 mg) by mouth daily    bisacodyl (DULCOLAX) 5 MG EC tablet Take 2 tablets at 3 pm the day before your procedure. If your procedure is before 11 am, take 2 additional tablets at 11 pm. If your procedure is after 11 am, take 2 additional tablets at 6 am. For additional instructions refer to your colonoscopy prep instructions. (Patient not taking: Reported on 8/17/2023)    polyethylene glycol (GOLYTELY) 236 g suspension The night before the exam at 6 pm drink an 8-ounce glass every 15 minutes until the jug is half empty. If you arrive before 11 AM: Drink the  other half of the Golytely jug at 11 PM night before procedure. If you arrive after 11 AM: Drink the other half of the Golytely jug at 6 AM day of procedure. For additional instructions refer to your colonoscopy prep instructions. (Patient not taking: Reported on 8/17/2023)     No current facility-administered medications for this visit.         Physical exam:  /76   Pulse 60   Temp 97.9  F (36.6  C) (Oral)   Resp 16   Wt 78.3 kg (172 lb 9.6 oz)   SpO2 96%   BMI 26.24 kg/m    Wt Readings from Last 4 Encounters:   08/17/23 78.3 kg (172 lb 9.6 oz)   04/24/23 73.9 kg (163 lb)   02/20/23 81.4 kg (179 lb 6.4 oz)   02/04/23 80.3 kg (177 lb)     General: Well appearing, nonlabored breathing  Neuro: Answering questions appropriately  Psych: Normal affect  Skin: Warm and dry

## 2023-08-21 RX ORDER — TERBINAFINE HYDROCHLORIDE 250 MG/1
1 TABLET ORAL DAILY
Qty: 90 TABLET | Refills: 0 | Status: SHIPPED | OUTPATIENT
Start: 2023-08-21 | End: 2023-12-05

## 2023-08-21 NOTE — TELEPHONE ENCOUNTER
terbinafine (LAMISIL) 250 MG tablet 90 tablet 0 5/22/2023     Not on protocol.     Rita Cabrales RN

## 2023-10-04 ENCOUNTER — MYC MEDICAL ADVICE (OUTPATIENT)
Dept: FAMILY MEDICINE | Facility: CLINIC | Age: 48
End: 2023-10-04
Payer: COMMERCIAL

## 2023-10-04 DIAGNOSIS — B35.1 FUNGAL TOENAIL INFECTION: ICD-10-CM

## 2023-10-04 DIAGNOSIS — Z13.9 SCREENING FOR CONDITION: ICD-10-CM

## 2023-10-04 DIAGNOSIS — E78.00 HYPERCHOLESTEROLEMIA: Primary | ICD-10-CM

## 2023-10-12 ENCOUNTER — LAB (OUTPATIENT)
Dept: LAB | Facility: CLINIC | Age: 48
End: 2023-10-12
Payer: COMMERCIAL

## 2023-10-12 DIAGNOSIS — B35.1 FUNGAL TOENAIL INFECTION: ICD-10-CM

## 2023-10-12 DIAGNOSIS — Z13.9 SCREENING FOR CONDITION: ICD-10-CM

## 2023-10-12 DIAGNOSIS — E78.00 HYPERCHOLESTEROLEMIA: ICD-10-CM

## 2023-10-12 LAB
ALBUMIN SERPL BCG-MCNC: 5 G/DL (ref 3.5–5.2)
ALP SERPL-CCNC: 66 U/L (ref 40–129)
ALT SERPL W P-5'-P-CCNC: 18 U/L (ref 0–70)
ANION GAP SERPL CALCULATED.3IONS-SCNC: 9 MMOL/L (ref 7–15)
AST SERPL W P-5'-P-CCNC: 26 U/L (ref 0–45)
BILIRUB SERPL-MCNC: 0.4 MG/DL
BUN SERPL-MCNC: 17.2 MG/DL (ref 6–20)
CALCIUM SERPL-MCNC: 9.9 MG/DL (ref 8.6–10)
CHLORIDE SERPL-SCNC: 105 MMOL/L (ref 98–107)
CHOLEST SERPL-MCNC: 219 MG/DL
CREAT SERPL-MCNC: 0.88 MG/DL (ref 0.67–1.17)
DEPRECATED HCO3 PLAS-SCNC: 26 MMOL/L (ref 22–29)
EGFRCR SERPLBLD CKD-EPI 2021: >90 ML/MIN/1.73M2
GLUCOSE SERPL-MCNC: 100 MG/DL (ref 70–99)
HBA1C MFR BLD: 5.8 %
HDLC SERPL-MCNC: 45 MG/DL
LDLC SERPL CALC-MCNC: 127 MG/DL
NONHDLC SERPL-MCNC: 174 MG/DL
POTASSIUM SERPL-SCNC: 4.3 MMOL/L (ref 3.4–5.3)
PROT SERPL-MCNC: 7.8 G/DL (ref 6.4–8.3)
SODIUM SERPL-SCNC: 140 MMOL/L (ref 135–145)
TRIGL SERPL-MCNC: 235 MG/DL

## 2023-10-12 PROCEDURE — 99000 SPECIMEN HANDLING OFFICE-LAB: CPT | Performed by: PATHOLOGY

## 2023-10-12 PROCEDURE — 36415 COLL VENOUS BLD VENIPUNCTURE: CPT | Performed by: PATHOLOGY

## 2023-10-12 PROCEDURE — 80053 COMPREHEN METABOLIC PANEL: CPT | Performed by: PATHOLOGY

## 2023-10-12 PROCEDURE — 80061 LIPID PANEL: CPT | Performed by: PATHOLOGY

## 2023-10-12 PROCEDURE — 83036 HEMOGLOBIN GLYCOSYLATED A1C: CPT | Performed by: FAMILY MEDICINE

## 2023-10-12 NOTE — RESULT ENCOUNTER NOTE
Cholesterol elevated please follow-up with Dr Nunez to review at scheduled appointment and to review remainder of results.   A1C in good range.  Best wishes,  Erasmo Morin MD

## 2023-10-25 ENCOUNTER — TELEPHONE (OUTPATIENT)
Dept: FAMILY MEDICINE | Facility: CLINIC | Age: 48
End: 2023-10-25

## 2023-11-30 DIAGNOSIS — B35.1 FUNGAL TOENAIL INFECTION: ICD-10-CM

## 2023-12-04 NOTE — TELEPHONE ENCOUNTER
terbinafine (LAMISIL) 250 MG tablet 90 tablet 0 8/21/2023     Last Office Visit : 2/4/2023  [facility] Kareen Arshad MD PhD     Future Office visit:  0    Routing refill request to provider for review/approval because:  Drug not on the [facility], P or [facility] refill protocol or controlled substance

## 2023-12-05 RX ORDER — TERBINAFINE HYDROCHLORIDE 250 MG/1
1 TABLET ORAL DAILY
Qty: 90 TABLET | Refills: 0 | Status: SHIPPED | OUTPATIENT
Start: 2023-12-05 | End: 2024-03-21

## 2024-01-15 NOTE — ANESTHESIA POSTPROCEDURE EVALUATION
Patient: Belen Arredondo    Procedure: Procedure(s):  COLONOSCOPY, WITH POLYPECTOMY       Anesthesia Type:  MAC    Note:  Disposition: Outpatient   Postop Pain Control: Uneventful            Sign Out: Well controlled pain   PONV: No   Neuro/Psych: Uneventful            Sign Out: Acceptable/Baseline neuro status   Airway/Respiratory: Uneventful            Sign Out: Acceptable/Baseline resp. status   CV/Hemodynamics: Uneventful            Sign Out: Acceptable CV status; No obvious hypovolemia; No obvious fluid overload   Other NRE: NONE   DID A NON-ROUTINE EVENT OCCUR? No           Last vitals:  Vitals Value Taken Time   /56 04/24/23 1422   Temp 36.3  C (97.3  F) 04/24/23 1422   Pulse     Resp 16 04/24/23 1422   SpO2 99 % 04/24/23 1422       Electronically Signed By: Haile Nelson MD  April 24, 2023  2:29 PM   [facility]  Progress Note  Name: Alejandra Patton I  MRN: [id number]  Unit/Bed#: -01 I Date of Admission: 1/12/2024   Date of Service: 1/15/2024 I Hospital Day: 2    Assessment/Plan   * Ambulatory dysfunction  Assessment & Plan  Present on admission secondary to acute on chronic hip pain  Had a recent cortisone injection at ortho office  Was scheduled for outpatient ortho follow up on Tuesday but could not make appointment due to pain  Reports chronic back pain as well   Pain poorly controlled despite medications in ER   PT OT recommending Level One/2 intensity  CM placed referral for STR  Ortho was consulted and recommended Medrol pack and continued follow-up outpatient  Patient additionally has neurosurgical outpatient follow-up on January 30  XR hip is negative for acute abnormalities     Chronic bilateral low back pain without sciatica  Assessment & Plan  Consider hip pain related to back pain vs hip pain     Hypertension  Assessment & Plan  Normotensive to low Blood Pressure: 146/70  On extensive blood pressure regimen  Holding majority of home regimen due to lower BPs   Holding parameters of medications            VTE Pharmacologic Prophylaxis: VTE Score: 4 Moderate Risk (Score 3-4) - Pharmacological DVT Prophylaxis Ordered: heparin.    Mobility:   Basic Mobility Inpatient Raw Score: 16  JH-HLM Goal: 5: Stand one or more mins  JH-HLM Achieved: 3: Sit at edge of bed  HLM Goal achieved. Continue to encourage appropriate mobility.    Patient Centered Rounds: I performed bedside rounds with nursing staff today.   Discussions with Specialists or Other Care Team Provider: CM    Education and Discussions with Family / Patient: Patient declined call to .     Total Time Spent on Date of Encounter in care of patient: 35 mins. This time was spent on one or more of the following: performing physical exam; counseling and coordination of care; obtaining or reviewing history;  documenting in the medical record; reviewing/ordering tests, medications or procedures; communicating with other healthcare professionals and discussing with patient's family/caregivers.    Current Length of Stay: 2 day(s)  Current Patient Status: Inpatient   Certification Statement: The patient will continue to require additional inpatient hospital stay due to awaiting placement  Discharge Plan: Anticipate discharge tomorrow to rehab facility.    Code Status: Level 1 - Full Code    Subjective:   Patient reports still having some intermittent hip pain however significantly improved to previous days.  She reports inability to walk.  After lengthy discussion patient continues to be adamant about only wanting 1 rehabilitation facility if accepted.    Objective:     Vitals:   Temp (24hrs), Av.7 °F (37.1 °C), Min:98.3 °F (36.8 °C), Max:99 °F (37.2 °C)    Temp:  [98.3 °F (36.8 °C)-99 °F (37.2 °C)] 98.3 °F (36.8 °C)  HR:  [68-80] 68  Resp:  [18] 18  BP: (144-146)/(70-73) 146/70  SpO2:  [93 %-98 %] 98 %  Body mass index is 26.45 kg/m².     Input and Output Summary (last 24 hours):     Intake/Output Summary (Last 24 hours) at 1/15/2024 1327  Last data filed at 1/15/2024 1253  Gross per 24 hour   Intake 480 ml   Output 700 ml   Net -220 ml       Physical Exam:   Physical Exam  Vitals and nursing note reviewed.   Constitutional:       Appearance: She is normal weight.   HENT:      Head: Normocephalic.      Nose: Nose normal.      Mouth/Throat:      Mouth: Mucous membranes are moist.      Pharynx: Oropharynx is clear.   Eyes:      General: No scleral icterus.     Conjunctiva/sclera: Conjunctivae normal.      Pupils: Pupils are equal, round, and reactive to light.   Cardiovascular:      Rate and Rhythm: Normal rate and regular rhythm.      Heart sounds: No murmur heard.     No friction rub. No gallop.   Pulmonary:      Effort: Pulmonary effort is normal. No respiratory distress.      Breath sounds: Normal breath sounds. No  stridor. No wheezing, rhonchi or rales.   Abdominal:      General: Abdomen is flat.      Palpations: Abdomen is soft.   Musculoskeletal:         General: Normal range of motion.      Cervical back: Normal range of motion and neck supple.      Right lower leg: No edema.      Left lower leg: No edema.   Lymphadenopathy:      Cervical: No cervical adenopathy.   Skin:     General: Skin is warm.      Coloration: Skin is not jaundiced or pale.      Findings: No bruising, erythema or lesion.   Neurological:      General: No focal deficit present.      Mental Status: She is alert and oriented to person, place, and time. Mental status is at baseline.      Cranial Nerves: No cranial nerve deficit.      Motor: No weakness.   Psychiatric:         Mood and Affect: Mood normal.         Behavior: Behavior normal.         Thought Content: Thought content normal.          Additional Data:     Labs:  Results from last 7 days   Lab Units 01/13/24  0541 01/12/24  1634   WBC Thousand/uL 9.43 9.14   HEMOGLOBIN g/dL 11.1* 11.6   HEMATOCRIT % 35.6 36.3   PLATELETS Thousands/uL 260 267   NEUTROS PCT %  --  69   LYMPHS PCT %  --  19   MONOS PCT %  --  10   EOS PCT %  --  1     Results from last 7 days   Lab Units 01/13/24  0541 01/12/24  1634   SODIUM mmol/L 139 138   POTASSIUM mmol/L 3.9 3.8   CHLORIDE mmol/L 107 105   CO2 mmol/L 26 27   BUN mg/dL 26* 26*   CREATININE mg/dL 0.94 0.90   ANION GAP mmol/L 6 6   CALCIUM mg/dL 8.7 9.0   ALBUMIN g/dL  --  3.5   TOTAL BILIRUBIN mg/dL  --  0.84   ALK PHOS U/L  --  41   ALT U/L  --  23   AST U/L  --  39   GLUCOSE RANDOM mg/dL 137 142*                       Lines/Drains:  Invasive Devices       Peripheral Intravenous Line  Duration             Peripheral IV 01/12/24 Left Antecubital 2 days              Drain  Duration             External Urinary Catheter 1 day                          Imaging: Reviewed radiology reports from this admission including: xray(s)    Recent Cultures (last 7 days):          Last 24 Hours Medication List:   Current Facility-Administered Medications   Medication Dose Route Frequency Provider Last Rate    acetaminophen  650 mg Oral Q6H PRN Calvin Darnell MD      acetaminophen  975 mg Oral Q8H [facility] Rebeca oFnseca PA-C      aspirin  81 mg Oral Daily Calvin Darnell MD      atorvastatin  40 mg Oral Daily Calvin Darnell MD      baclofen  10 mg Oral HS LAURIE Cooley      calcium carbonate  1,000 mg Oral Daily PRN Calvin Darnell MD      carvedilol  12.5 mg Oral BID With Meals Calvin Darnell MD      Diclofenac Sodium  2 g Topical Once Servando Lakhani PA-C      docusate sodium  100 mg Oral BID Calvin Darnell MD      heparin (porcine)  5,000 Units Subcutaneous Q8H [facility] Calvin Darnell MD      HYDROmorphone  0.5 mg Intravenous Q4H PRN Calvin Darnell MD      HYDROmorphone  0.5 mg Intravenous Q4H PRN Calvin Darnell MD      latanoprost  1 drop Both Eyes HS LAURIE Cooley      levothyroxine  50 mcg Oral Early Morning Calvin Darnell MD      lidocaine  1 patch Topical Daily Rebeca Fonseca PA-C      [START ON 1/16/2024] methylPREDNISolone  16 mg Oral Daily Magda Gates PA-C      Followed by    [START ON 1/17/2024] methylPREDNISolone  12 mg Oral Daily Magda Gates PA-C      Followed by    [START ON 1/18/2024] methylPREDNISolone  8 mg Oral Daily Magda Gates PA-C      Followed by    [START ON 1/19/2024] methylPREDNISolone  4 mg Oral Daily Magda Gates PA-C      ondansetron  4 mg Intravenous Q6H PRN Calvin Darnell MD      oxyCODONE  5 mg Oral Q4H PRN Calvin Darnell MD      senna  1 tablet Oral Daily Calvin Darnell MD      zolpidem  5 mg Oral HS LAURIE Cooley          Today, Patient Was Seen By: Germán Otto Prator, PA-C    **Please Note: This note may have been constructed using a voice recognition system.**

## 2024-03-13 DIAGNOSIS — B35.1 FUNGAL TOENAIL INFECTION: ICD-10-CM

## 2024-03-20 NOTE — TELEPHONE ENCOUNTER
terbinafine (LAMISIL) 250 MG tablet     90 tablet 0 12/5/2023     Last Office Visit : 2-4-2023  Future Office visit:  none    ASSESSMENT/PLAN:   (Z00.00) Annual physical exam  (primary encounter diagnosis)  Comment: immunizations UTD - colonoscopy recommended - PSA ordered  - previous smoker but doesn't meet criteria for screening   Plan: sildenafil (VIAGRA) 25 MG tablet        See orders      (R03.0) Elevated blood pressure reading without diagnosis of hypertension  Comment: on occasion gets elevated readings at home  - no meds - bp here today is normal  Plan: follow      (B35.1) Fungal toenail infection  Comment: check LFT's - lamisil for 3 months   Plan: terbinafine (LAMISIL) 250 MG tablet, Hepatic         panel (Albumin, ALT, AST, Bili, Alk Phos, TP)

## 2024-03-21 RX ORDER — TERBINAFINE HYDROCHLORIDE 250 MG/1
1 TABLET ORAL DAILY
Qty: 30 TABLET | Refills: 1 | Status: SHIPPED | OUTPATIENT
Start: 2024-03-21 | End: 2024-06-03

## 2024-04-20 ENCOUNTER — HEALTH MAINTENANCE LETTER (OUTPATIENT)
Age: 49
End: 2024-04-20

## 2024-05-31 DIAGNOSIS — B35.1 FUNGAL TOENAIL INFECTION: ICD-10-CM

## 2024-05-31 NOTE — TELEPHONE ENCOUNTER
terbinafine (LAMISIL) 250 MG tablet 30 tablet 1 3/21/2024 -- No   Sig - Route: Take 1 tablet (250 mg) by mouth daily Patient needs to see primary provider for further refills. Please call for an appointment at [phone]. - Oral     ----------------------  Last Office Visit : 2/4/23 - Enrique  Future Office visit:  0  ----------------------    Routing refill request to provider for review/approval because:  Medication not on protocol.

## 2024-06-03 RX ORDER — TERBINAFINE HYDROCHLORIDE 250 MG/1
1 TABLET ORAL DAILY
Qty: 15 TABLET | Refills: 0 | Status: SHIPPED | OUTPATIENT
Start: 2024-06-03

## 2024-12-18 ENCOUNTER — OFFICE VISIT (OUTPATIENT)
Dept: OPHTHALMOLOGY | Facility: CLINIC | Age: 49
End: 2024-12-18
Attending: OPHTHALMOLOGY
Payer: COMMERCIAL

## 2024-12-18 ENCOUNTER — MYC MEDICAL ADVICE (OUTPATIENT)
Dept: FAMILY MEDICINE | Facility: CLINIC | Age: 49
End: 2024-12-18

## 2024-12-18 DIAGNOSIS — H52.7 REFRACTIVE ERROR: ICD-10-CM

## 2024-12-18 DIAGNOSIS — L03.213 PRESEPTAL CELLULITIS OF RIGHT UPPER EYELID: Primary | ICD-10-CM

## 2024-12-18 PROCEDURE — 99211 OFF/OP EST MAY X REQ PHY/QHP: CPT | Performed by: OPHTHALMOLOGY

## 2024-12-18 PROCEDURE — 92002 INTRM OPH EXAM NEW PATIENT: CPT | Performed by: OPHTHALMOLOGY

## 2024-12-18 RX ORDER — CLINDAMYCIN HYDROCHLORIDE 300 MG/1
600 CAPSULE ORAL 3 TIMES DAILY
Qty: 42 CAPSULE | Refills: 0 | Status: SHIPPED | OUTPATIENT
Start: 2024-12-18 | End: 2024-12-25

## 2024-12-18 ASSESSMENT — CUP TO DISC RATIO
OS_RATIO: 0.2
OD_RATIO: 0.2

## 2024-12-18 ASSESSMENT — SLIT LAMP EXAM - LIDS: COMMENTS: NORMAL

## 2024-12-18 ASSESSMENT — REFRACTION_WEARINGRX
OD_SPHERE: -7.75
OD_CYLINDER: SPHERE
OS_AXIS: 088
OS_SPHERE: -6.75
SPECS_TYPE: SVL
OS_CYLINDER: +0.50

## 2024-12-18 ASSESSMENT — TONOMETRY
OD_IOP_MMHG: 20
OS_IOP_MMHG: 18
IOP_METHOD: TONOPEN

## 2024-12-18 ASSESSMENT — VISUAL ACUITY
OD_CC: 20/20
OS_CC: 20/20
CORRECTION_TYPE: GLASSES
METHOD: SNELLEN - LINEAR

## 2024-12-18 NOTE — NURSING NOTE
Chief Complaints and History of Present Illnesses   Patient presents with    COMPREHENSIVE EYE EXAM     Chief Complaint(s) and History of Present Illness(es)       COMPREHENSIVE EYE EXAM              Course: stable    Associated symptoms: itching and swelling.  Negative for eye pain, tearing and discharge    Pain scale: 0/10              Comments    Pt has swollen, itchy RUL since Monday morning. He wonders if it's a blocked gland, and now is spreading to the entire RUL. Vision is not affected.     He's been applying mupirocin starting Monday night, at bedtime. He notes he usually wears contact lenses, but not on weekends.     Deb Boo OA 2:43 PM December 18, 2024

## 2024-12-18 NOTE — PROGRESS NOTES
HPI       COMPREHENSIVE EYE EXAM    Since onset it is stable.  Associated symptoms include itching and swelling.  Negative for eye pain, tearing and discharge.  Pain was noted as 0/10.             Comments    Pt has swollen, itchy RUL since Monday morning. He wonders if it's a blocked gland, and now is spreading to the entire RUL. Vision is not affected.     He's been applying mupirocin starting Monday night, at bedtime. He notes he usually wears contact lenses, but not on weekends.     Debprasanna Boo OA 2:43 PM December 18, 2024     Vision ok, no diplopia. Denies trauma. Normally wears soft contact lens  NO Diabetes mellitus   No other medical problems            Last edited by Ja Singleton MD on 12/18/2024  2:59 PM.         Review of systems for the eyes was negative other than the pertinent positives/negatives listed in the HPI.      Assessment & Plan    HPI:  Belen Arredondo is a 49 year old male with history of refractive error presents for right upper eyelid swelling x 2 days. Applying mupirocin since Monday night. Denies diplopia, vision changes, trauma. No other medical problems.       POHx: refractive error  PMHx: denies  Current Medications:   Current Outpatient Medications   Medication Sig Dispense Refill    clindamycin (CLEOCIN) 300 MG capsule Take 2 capsules (600 mg) by mouth 3 times daily for 7 days. 42 capsule 0    terbinafine (LAMISIL) 250 MG tablet Take 1 tablet (250 mg) by mouth daily Patient needs to see primary provider for further refills. NO MORE REFILLS. Please call for an appointment at [phone]. 15 tablet 0    bisacodyl (DULCOLAX) 5 MG EC tablet Take 2 tablets at 3 pm the day before your procedure. If your procedure is before 11 am, take 2 additional tablets at 11 pm. If your procedure is after 11 am, take 2 additional tablets at 6 am. For additional instructions refer to your colonoscopy prep instructions. (Patient not taking: Reported on 8/17/2023) 4 tablet 0     polyethylene glycol (GOLYTELY) 236 g suspension The night before the exam at 6 pm drink an 8-ounce glass every 15 minutes until the jug is half empty. If you arrive before 11 AM: Drink the other half of the Golytely jug at 11 PM night before procedure. If you arrive after 11 AM: Drink the other half of the Golytely jug at 6 AM day of procedure. For additional instructions refer to your colonoscopy prep instructions. (Patient not taking: Reported on 8/17/2023) 4000 mL 0    sildenafil (VIAGRA) 25 MG tablet Take 2 tablets or 50mg  30 minutes to 4 hrs prior to intercourse (Patient not taking: Reported on 12/18/2024) 25 tablet 1     No current facility-administered medications for this visit.     FHx: denies family history of ocular conditions   PSHx: denies history of ocular surgeries       Current Eye Medications:      Assessment & Plan:  (L03.213) Preseptal cellulitis of right upper eyelid  (primary encounter diagnosis)  Sulfa and pcn allergy  Start clindamycin 600mg three times a day  Discussed return to clinic precautions including worsening vision, diplopia, worsening swelling, fevers, chills  Return to clinic 1 week    (H52.7) Refractive error  Doing well in current MRx      Return in about 8 days (around 12/26/2024) for THURSDAY WITH DR. LAZO in AM.        Ja Singleton MD     Attending Physician Attestation:  Complete documentation of historical and exam elements from today's encounter can be found in the full encounter summary report (not reduplicated in this progress note).  I personally obtained the chief complaint(s) and history of present illness.  I confirmed and edited as necessary the review of systems, past medical/surgical history, family history, social history, and examination findings as documented by others; and I examined the patient myself.  I personally reviewed the relevant tests, images, and reports as documented above.  I formulated and edited as necessary the assessment and plan  and discussed the findings and management plan with the patient and family. - Ja Singleton MD

## 2024-12-26 ENCOUNTER — OFFICE VISIT (OUTPATIENT)
Dept: OPHTHALMOLOGY | Facility: CLINIC | Age: 49
End: 2024-12-26
Payer: COMMERCIAL

## 2024-12-26 DIAGNOSIS — H00.11 CHALAZION OF RIGHT UPPER EYELID: ICD-10-CM

## 2024-12-26 DIAGNOSIS — L03.213 PRESEPTAL CELLULITIS OF RIGHT UPPER EYELID: Primary | ICD-10-CM

## 2024-12-26 PROCEDURE — 99211 OFF/OP EST MAY X REQ PHY/QHP: CPT

## 2024-12-26 ASSESSMENT — REFRACTION_WEARINGRX
OD_SPHERE: -7.75
OD_CYLINDER: SPHERE
OS_CYLINDER: +0.50
OS_AXIS: 088
OS_SPHERE: -6.75
SPECS_TYPE: SVL

## 2024-12-26 ASSESSMENT — CUP TO DISC RATIO
OS_RATIO: 0.2
OD_RATIO: 0.2

## 2024-12-26 ASSESSMENT — VISUAL ACUITY
CORRECTION_TYPE: GLASSES
OD_CC: 20/20
METHOD: SNELLEN - LINEAR
OD_CC+: -1
OS_CC: 20/20

## 2024-12-26 ASSESSMENT — SLIT LAMP EXAM - LIDS: COMMENTS: NORMAL

## 2024-12-26 ASSESSMENT — TONOMETRY
OS_IOP_MMHG: 16
IOP_METHOD: TONOPEN
OD_IOP_MMHG: 15

## 2024-12-26 NOTE — PATIENT INSTRUCTIONS
Start warm compresses 10-15 mins on right eye 2 times a day.    Continue clindamycin 600mg three times a day until medication finishes.    Start Tobradex ointment 0.25 inch on right eye 2 times a day for 2 weeks.

## 2024-12-26 NOTE — NURSING NOTE
Chief Complaints and History of Present Illnesses   Patient presents with    Follow Up     Chief Complaint(s) and History of Present Illness(es)       Follow Up              Course: stable    Associated symptoms: redness and swelling.  Negative for itching    Treatments tried: no treatments              Comments    Pt reports his swelling is much improved and wants to know if he should continue his antibiotics. The tiny lump can still be felt.     Clindamycin TID    Deb GARCIA 1:06 PM December 26, 2024

## 2024-12-26 NOTE — PROGRESS NOTES
History  HPI       Follow Up    Since onset it is stable.  Associated symptoms include redness and swelling.  Negative for itching.  Treatments tried include no treatments.             Comments    Pt reports his swelling is much improved and wants to know if he should continue his antibiotics. The tiny lump can still be felt.     Clindamycin TID    Deb Boo OA 1:06 PM December 26, 2024             Last edited by Deb Boo on 12/26/2024  1:06 PM.          Assessment/Plan  (L03.213) Preseptal cellulitis of right upper eyelid  (primary encounter diagnosis)  Plan:   -Patient educated on today's findings.  -Seen by Dr. Singleton Last week. Started oral medication.  -Much improved today.  -Continue clindamycin 600mg three times a day until medication finished.  -Monitor.    (H00.11) Chalazion of right upper eyelid  Plan:   -Chalazion developed on the Nasal RUL.   -No pain on exam.  -Discussed warm compresses 10-15 mins BID right eye    -Due to allergies, Start Tobradex ointment BID right eye   -RTC if increased redness, pain or light sensitivity.   -Discussed if still present after 2 week consider excision.    Return to clinic in 2 weeks for [facility] and IOP.     Complete documentation of historical and exam elements from today's encounter can be found in the full encounter summary report (not reduplicated in this progress note). I personally obtained the chief complaint(s) and history of present illness. I confirmed and edited as necessary the review of systems, past medical/surgical history, family history, social history, and examination findings as document by others; and I examined the patient myself. I personally reviewed the relevant tests, images, and reports as documented above. I formulated and edited as necessary the assessment and plan and discussed the findings and management plan with the patient and family.    Riccardo Malik OD

## 2025-02-04 SDOH — HEALTH STABILITY: PHYSICAL HEALTH: ON AVERAGE, HOW MANY DAYS PER WEEK DO YOU ENGAGE IN MODERATE TO STRENUOUS EXERCISE (LIKE A BRISK WALK)?: 0 DAYS

## 2025-02-04 SDOH — HEALTH STABILITY: PHYSICAL HEALTH: ON AVERAGE, HOW MANY MINUTES DO YOU ENGAGE IN EXERCISE AT THIS LEVEL?: 0 MIN

## 2025-02-04 ASSESSMENT — SOCIAL DETERMINANTS OF HEALTH (SDOH): HOW OFTEN DO YOU GET TOGETHER WITH FRIENDS OR RELATIVES?: MORE THAN THREE TIMES A WEEK

## 2025-02-04 ASSESSMENT — PATIENT HEALTH QUESTIONNAIRE - PHQ9
SUM OF ALL RESPONSES TO PHQ QUESTIONS 1-9: 1
SUM OF ALL RESPONSES TO PHQ QUESTIONS 1-9: 1
10. IF YOU CHECKED OFF ANY PROBLEMS, HOW DIFFICULT HAVE THESE PROBLEMS MADE IT FOR YOU TO DO YOUR WORK, TAKE CARE OF THINGS AT HOME, OR GET ALONG WITH OTHER PEOPLE: SOMEWHAT DIFFICULT

## 2025-02-04 NOTE — PROGRESS NOTES
See note   Answers submitted by the patient for this visit:  Patient Health Questionnaire (Submitted on 2/4/2025)  If you checked off any problems, how difficult have these problems made it for you to do your work, take care of things at home, or get along with other people?: Somewhat difficult  PHQ9 TOTAL SCORE: 1

## 2025-02-05 ENCOUNTER — LAB (OUTPATIENT)
Dept: LAB | Facility: CLINIC | Age: 50
End: 2025-02-05
Payer: COMMERCIAL

## 2025-02-05 ENCOUNTER — OFFICE VISIT (OUTPATIENT)
Dept: FAMILY MEDICINE | Facility: CLINIC | Age: 50
End: 2025-02-05
Payer: COMMERCIAL

## 2025-02-05 ENCOUNTER — ANCILLARY PROCEDURE (OUTPATIENT)
Dept: GENERAL RADIOLOGY | Facility: CLINIC | Age: 50
End: 2025-02-05
Attending: FAMILY MEDICINE
Payer: COMMERCIAL

## 2025-02-05 VITALS
TEMPERATURE: 97.8 F | RESPIRATION RATE: 16 BRPM | DIASTOLIC BLOOD PRESSURE: 77 MMHG | BODY MASS INDEX: 26.64 KG/M2 | HEIGHT: 68 IN | HEART RATE: 58 BPM | WEIGHT: 175.8 LBS | OXYGEN SATURATION: 98 % | SYSTOLIC BLOOD PRESSURE: 117 MMHG

## 2025-02-05 DIAGNOSIS — Z13.1 SCREENING FOR DIABETES MELLITUS: ICD-10-CM

## 2025-02-05 DIAGNOSIS — Z12.5 SCREENING FOR PROSTATE CANCER: ICD-10-CM

## 2025-02-05 DIAGNOSIS — Z23 ENCOUNTER FOR IMMUNIZATION: ICD-10-CM

## 2025-02-05 DIAGNOSIS — Z13.29 SCREENING FOR THYROID DISORDER: ICD-10-CM

## 2025-02-05 DIAGNOSIS — R73.03 PREDIABETES: ICD-10-CM

## 2025-02-05 DIAGNOSIS — M54.50 LUMBAR BACK PAIN: ICD-10-CM

## 2025-02-05 DIAGNOSIS — Z13.0 SCREENING FOR DEFICIENCY ANEMIA: ICD-10-CM

## 2025-02-05 DIAGNOSIS — Z13.220 SCREENING FOR HYPERLIPIDEMIA: ICD-10-CM

## 2025-02-05 DIAGNOSIS — Z00.00 ROUTINE GENERAL MEDICAL EXAMINATION AT A HEALTH CARE FACILITY: ICD-10-CM

## 2025-02-05 DIAGNOSIS — R91.8 PULMONARY NODULES: ICD-10-CM

## 2025-02-05 DIAGNOSIS — F33.1 MAJOR DEPRESSIVE DISORDER, RECURRENT EPISODE, MODERATE (H): ICD-10-CM

## 2025-02-05 DIAGNOSIS — Z00.00 ANNUAL PHYSICAL EXAM: Primary | ICD-10-CM

## 2025-02-05 DIAGNOSIS — B35.1 FUNGAL TOENAIL INFECTION: ICD-10-CM

## 2025-02-05 LAB
ALBUMIN SERPL BCG-MCNC: 4.8 G/DL (ref 3.5–5.2)
ALP SERPL-CCNC: 68 U/L (ref 40–150)
ALT SERPL W P-5'-P-CCNC: 12 U/L (ref 0–70)
ANION GAP SERPL CALCULATED.3IONS-SCNC: 9 MMOL/L (ref 7–15)
AST SERPL W P-5'-P-CCNC: 24 U/L (ref 0–45)
BASOPHILS # BLD AUTO: 0 10E3/UL (ref 0–0.2)
BASOPHILS NFR BLD AUTO: 0 %
BILIRUB SERPL-MCNC: 0.4 MG/DL
BUN SERPL-MCNC: 17.5 MG/DL (ref 6–20)
CALCIUM SERPL-MCNC: 9.5 MG/DL (ref 8.8–10.4)
CHLORIDE SERPL-SCNC: 105 MMOL/L (ref 98–107)
CHOLEST SERPL-MCNC: 210 MG/DL
CREAT SERPL-MCNC: 0.85 MG/DL (ref 0.67–1.17)
EGFRCR SERPLBLD CKD-EPI 2021: >90 ML/MIN/1.73M2
EOSINOPHIL # BLD AUTO: 0.1 10E3/UL (ref 0–0.7)
EOSINOPHIL NFR BLD AUTO: 1 %
ERYTHROCYTE [DISTWIDTH] IN BLOOD BY AUTOMATED COUNT: 12.6 % (ref 10–15)
EST. AVERAGE GLUCOSE BLD GHB EST-MCNC: 126 MG/DL
FASTING STATUS PATIENT QL REPORTED: YES
FASTING STATUS PATIENT QL REPORTED: YES
GLUCOSE SERPL-MCNC: 96 MG/DL (ref 70–99)
HBA1C MFR BLD: 6 %
HCO3 SERPL-SCNC: 27 MMOL/L (ref 22–29)
HCT VFR BLD AUTO: 45.1 % (ref 40–53)
HDLC SERPL-MCNC: 45 MG/DL
HGB BLD-MCNC: 15 G/DL (ref 13.3–17.7)
IMM GRANULOCYTES # BLD: 0 10E3/UL
IMM GRANULOCYTES NFR BLD: 0 %
LDLC SERPL CALC-MCNC: 140 MG/DL
LYMPHOCYTES # BLD AUTO: 1.5 10E3/UL (ref 0.8–5.3)
LYMPHOCYTES NFR BLD AUTO: 39 %
MCH RBC QN AUTO: 27.6 PG (ref 26.5–33)
MCHC RBC AUTO-ENTMCNC: 33.3 G/DL (ref 31.5–36.5)
MCV RBC AUTO: 83 FL (ref 78–100)
MONOCYTES # BLD AUTO: 0.3 10E3/UL (ref 0–1.3)
MONOCYTES NFR BLD AUTO: 7 %
NEUTROPHILS # BLD AUTO: 1.9 10E3/UL (ref 1.6–8.3)
NEUTROPHILS NFR BLD AUTO: 52 %
NONHDLC SERPL-MCNC: 165 MG/DL
NRBC # BLD AUTO: 0 10E3/UL
NRBC BLD AUTO-RTO: 0 /100
PLATELET # BLD AUTO: 179 10E3/UL (ref 150–450)
POTASSIUM SERPL-SCNC: 4.1 MMOL/L (ref 3.4–5.3)
PROT SERPL-MCNC: 7.6 G/DL (ref 6.4–8.3)
PSA SERPL DL<=0.01 NG/ML-MCNC: 1.25 NG/ML (ref 0–3.5)
RBC # BLD AUTO: 5.44 10E6/UL (ref 4.4–5.9)
SODIUM SERPL-SCNC: 141 MMOL/L (ref 135–145)
TRIGL SERPL-MCNC: 126 MG/DL
TSH SERPL DL<=0.005 MIU/L-ACNC: 1.98 UIU/ML (ref 0.3–4.2)
WBC # BLD AUTO: 3.7 10E3/UL (ref 4–11)

## 2025-02-05 PROCEDURE — 83036 HEMOGLOBIN GLYCOSYLATED A1C: CPT | Performed by: FAMILY MEDICINE

## 2025-02-05 PROCEDURE — 80053 COMPREHEN METABOLIC PANEL: CPT | Performed by: PATHOLOGY

## 2025-02-05 PROCEDURE — 72100 X-RAY EXAM L-S SPINE 2/3 VWS: CPT | Performed by: STUDENT IN AN ORGANIZED HEALTH CARE EDUCATION/TRAINING PROGRAM

## 2025-02-05 PROCEDURE — G0103 PSA SCREENING: HCPCS | Performed by: PATHOLOGY

## 2025-02-05 PROCEDURE — 99000 SPECIMEN HANDLING OFFICE-LAB: CPT | Performed by: PATHOLOGY

## 2025-02-05 PROCEDURE — 85025 COMPLETE CBC W/AUTO DIFF WBC: CPT | Performed by: PATHOLOGY

## 2025-02-05 PROCEDURE — 80061 LIPID PANEL: CPT | Performed by: PATHOLOGY

## 2025-02-05 PROCEDURE — 84443 ASSAY THYROID STIM HORMONE: CPT | Performed by: PATHOLOGY

## 2025-02-05 PROCEDURE — 36415 COLL VENOUS BLD VENIPUNCTURE: CPT | Performed by: PATHOLOGY

## 2025-02-05 NOTE — PROGRESS NOTES
"Preventive Care Visit  [facility]  Kareen Nunez MD PhD, Family Medicine  Feb 5, 2025      Assessment & Plan     Fungal toenail infection  Using oral antifungal med     Encounter for immunization  given  - Pneumococcal 20 Valent Conjugate (PCV20)    Screening for hyperlipidemia  No statin - ca score =38  - suggested a statin - will get lipid again and re address  - Lipid panel reflex to direct LDL Fasting    Prediabetes  5.8 in 2023 will recheck - push diet   - Hemoglobin A1c    Screening for prostate cancer  Discussed and decided to proceed  - PSA, screen    Screening for deficiency anemia  Will check hgb  - CBC with platelets and differential    Pulmonary nodules  Last CT 2023 - nodules- stable - pt asking for CT   - CT Chest w/o Contrast    Lumbar back pain  No sciatica - likely DDD -  will get xray and PT   - XR Lumbar Spine 2/3 Views  - Physical Therapy  Referral    Screening for thyroid disorder  Last TSH 2020  - TSH with free T4 reflex    Annual physical exam  Colonoscopy UTD - needs pneumovax 20, flu shot given,     Screening for diabetes mellitus  Will check LFT's  - pt on antifungal med   - Comprehensive metabolic panel (BMP + Alb, Alk Phos, ALT, AST, Total. Bili, TP)    MDD  Well controlled - PHQ9=1  no medication       Patient has been advised of split billing requirements and indicates understanding: Yes        BMI  Estimated body mass index is 26.74 kg/m  as calculated from the following:    Height as of this encounter: 1.727 m (5' 7.99\").    Weight as of this encounter: 79.7 kg (175 lb 12.8 oz).   Weight management plan: Discussed healthy diet and exercise guidelines    Counseling  Appropriate preventive services were addressed with this patient via screening, questionnaire, or discussion as appropriate for fall prevention, nutrition, physical activity, Tobacco-use cessation, social engagement, weight loss and cognition.  Checklist reviewing " preventive services available has been given to the patient.  Reviewed patient's diet, addressing concerns and/or questions.   The patient was instructed to see the dentist every 6 months.           Return in about 53 weeks (around 2/11/2026) for Annual Wellness Visit.    Chinmay Glover is a 50 year old, presenting for the following:  Physical        2/5/2025    11:42 AM   Additional Questions   Roomed by boogie boucher          HPI  ED  - sildenafil -wants to keep this prescription       Pulmonary nodules - no change for 10 yrs   - followed by pulmonary - last chest CT was 8/2023 and left upper lobe nodule had completely resolved and no longer needed f/up CT.  Pt asking for f/up CT of nodules       Noted mild coronary calcium  - coronary calcium CT 2/2023  - mild calcifications  - score = 39   no statin  - lipid 10/2023         Fungal infection - resolved -         Elevated bp - taking home measurement - under 130/80       Lower back pain - intermittent flares - no xray - no leg numbness -     Depression: PHQ-9 was 1 today, his mood is good - no harmful thoughts - work is good      Healthcare maintenance:   Glucose: every 5 years, yearly if risk factors - Recommended glucose =96 2020  - had elevated A1C  2023   A1C = 5.8  2023   Lipid panel: every 5 years, yearly if risk factors - Recommended LDL  =127  2023  Hepatitis C (adults born 1947 - 1965): once per lifetime - negative 2009  Immunizations (Tetanus every 10 years, Influenza yearly, Pneumonia PPV-23 and PCV-13 age >= 65 or sooner if risk factors) -   TDaP  2020  Needs influenza  Colonoscopy - 4/2023   Lo dose lung CT - only 10 yr pack hx quit 125yrs ago  Doesn't meet criteria             Health Care Directive  Patient does not have a Health Care Directive: Discussed advance care planning with patient; information given to patient to review.      2/4/2025   General Health   How would you rate your overall physical health? Excellent   Feel stress (tense,  anxious, or unable to sleep) Not at all         2025   Nutrition   Three or more servings of calcium each day? (!) NO   Diet: Low fat/cholesterol    Carbohydrate counting    Breakfast skipped   How many servings of fruit and vegetables per day? (!) 2-3   How many sweetened beverages each day? 0-1       Multiple values from one day are sorted in reverse-chronological order         2025   Exercise   Days per week of moderate/strenous exercise 0 days   Average minutes spent exercising at this level 0 min   (!) EXERCISE CONCERN      2025   Social Factors   Frequency of gathering with friends or relatives More than three times a week   Worry food won't last until get money to buy more No   Food not last or not have enough money for food? No   Do you have housing? (Housing is defined as stable permanent housing and does not include staying ouside in a car, in a tent, in an abandoned building, in an overnight shelter, or couch-surfing.) Yes   Are you worried about losing your housing? No   Lack of transportation? No   Unable to get utilities (heat,electricity)? No         2025   Fall Risk   Fallen 2 or more times in the past year? No   Trouble with walking or balance? No          2025   Dental   Dentist two times every year? (!) NO          Today's PHQ-9 Score:       2025     1:51 PM   PHQ-9 SCORE   PHQ-9 Total Score MyChart 1 (Minimal depression)   PHQ-9 Total Score 1        Patient-reported         2025   Substance Use   Alcohol more than 3/day or more than 7/wk No   Do you use any other substances recreationally? No     Social History     Tobacco Use    Smoking status: Former     Current packs/day: 0.00     Average packs/day: 1 pack/day for 20.4 years (20.4 ttl pk-yrs)     Types: Cigarettes     Start date: 1990     Quit date: 2011     Years since quittin.0    Smokeless tobacco: Never   Substance Use Topics    Alcohol use: Yes     Comment: rare occasional beer     Drug use: No  "            2/4/2025   One time HIV Screening   Previous HIV test? Yes         2/4/2025   STI Screening   New sexual partner(s) since last STI/HIV test? No   ASCVD Risk   The 10-year ASCVD risk score (Delfino SUAZO, et al., 2019) is: 4.5%    Values used to calculate the score:      Age: 50 years      Sex: Male      Is Non- : No      Diabetic: No      Tobacco smoker: No      Systolic Blood Pressure: 117 mmHg      Is BP treated: No      HDL Cholesterol: 45 mg/dL      Total Cholesterol: 219 mg/dL           Reviewed and updated as needed this visit by Provider                          Review of Systems  Constitutional, HEENT, cardiovascular, pulmonary, GI, , musculoskeletal, neuro, skin, endocrine and psych systems are negative, except as otherwise noted.     Objective    Exam  /77 (BP Location: Right arm, Patient Position: Sitting, Cuff Size: Adult Large)   Pulse 58   Temp 97.8  F (36.6  C) (Oral)   Resp 16   Ht 1.727 m (5' 7.99\")   Wt 79.7 kg (175 lb 12.8 oz)   SpO2 98%   BMI 26.74 kg/m     Estimated body mass index is 26.74 kg/m  as calculated from the following:    Height as of this encounter: 1.727 m (5' 7.99\").    Weight as of this encounter: 79.7 kg (175 lb 12.8 oz).    Physical Exam  GENERAL: alert and no distress  EYES: Eyes grossly normal to inspection, PERRL and conjunctivae and sclerae normal  HENT: ear canals and TM's normal, nose and mouth without ulcers or lesions  NECK: no adenopathy, no asymmetry, masses, or scars  RESP: lungs clear to auscultation - no rales, rhonchi or wheezes  CV: regular rate and rhythm, normal S1 S2, no S3 or S4, no murmur, click or rub, no peripheral edema  ABDOMEN: soft, nontender, no hepatosplenomegaly, no masses and bowel sounds normal  GENITAL no penile lesions - no scrotal lesions or masses,  RECTAL: small hemorrhoids - no masses,  prostate smooth   MS: no gross musculoskeletal defects noted, no edema  SKIN: no suspicious lesions or " rashes  NEURO: Normal strength and tone, mentation intact and speech normal  PSYCH: mentation appears normal, affect normal/bright        Signed Electronically by: Kareen Nunez MD PhD    Answers submitted by the patient for this visit:  Patient Health Questionnaire (Submitted on 2/4/2025)  If you checked off any problems, how difficult have these problems made it for you to do your work, take care of things at home, or get along with other people?: Somewhat difficult  PHQ9 TOTAL SCORE: 1

## 2025-02-05 NOTE — PATIENT INSTRUCTIONS
Blood work today  CT of chest ordered  Xray of low back  Physical therapy for your back   Flu shot  Pneumovax 20 today             Patient Education   Preventive Care Advice   This is general advice given by our system to help you stay healthy. However, your care team may have specific advice just for you. Please talk to your care team about your preventive care needs.  Nutrition  Eat 5 or more servings of fruits and vegetables each day.  Try wheat bread, brown rice and whole grain pasta (instead of white bread, rice, and pasta).  Get enough calcium and vitamin D. Check the label on foods and aim for 100% of the RDA (recommended daily allowance).  Lifestyle  Exercise at least 150 minutes each week  (30 minutes a day, 5 days a week).  Do muscle strengthening activities 2 days a week. These help control your weight and prevent disease.  No smoking.  Wear sunscreen to prevent skin cancer.  Have a dental exam and cleaning every 6 months.  Yearly exams  See your health care team every year to talk about:  Any changes in your health.  Any medicines your care team has prescribed.  Preventive care, family planning, and ways to prevent chronic diseases.  Shots (vaccines)   HPV shots (up to age 26), if you've never had them before.  Hepatitis B shots (up to age 59), if you've never had them before.  COVID-19 shot: Get this shot when it's due.  Flu shot: Get a flu shot every year.  Tetanus shot: Get a tetanus shot every 10 years.  Pneumococcal, hepatitis A, and RSV shots: Ask your care team if you need these based on your risk.  Shingles shot (for age 50 and up)  General health tests  Diabetes screening:  Starting at age 35, Get screened for diabetes at least every 3 years.  If you are younger than age 35, ask your care team if you should be screened for diabetes.  Cholesterol test: At age 39, start having a cholesterol test every 5 years, or more often if advised.  Bone density scan (DEXA): At age 50, ask your care team if  you should have this scan for osteoporosis (brittle bones).  Hepatitis C: Get tested at least once in your life.  STIs (sexually transmitted infections)  Before age 24: Ask your care team if you should be screened for STIs.  After age 24: Get screened for STIs if you're at risk. You are at risk for STIs (including HIV) if:  You are sexually active with more than one person.  You don't use condoms every time.  You or a partner was diagnosed with a sexually transmitted infection.  If you are at risk for HIV, ask about PrEP medicine to prevent HIV.  Get tested for HIV at least once in your life, whether you are at risk for HIV or not.  Cancer screening tests  Cervical cancer screening: If you have a cervix, begin getting regular cervical cancer screening tests starting at age 21.  Breast cancer scan (mammogram): If you've ever had breasts, begin having regular mammograms starting at age 40. This is a scan to check for breast cancer.  Colon cancer screening: It is important to start screening for colon cancer at age 45.  Have a colonoscopy test every 10 years (or more often if you're at risk) Or, ask your provider about stool tests like a FIT test every year or Cologuard test every 3 years.  To learn more about your testing options, visit:   .  For help making a decision, visit:   https://bit.ly/mm24018.  Prostate cancer screening test: If you have a prostate, ask your care team if a prostate cancer screening test (PSA) at age 55 is right for you.  Lung cancer screening: If you are a current or former smoker ages 50 to 80, ask your care team if ongoing lung cancer screenings are right for you.  For informational purposes only. Not to replace the advice of your health care provider. Copyright   2023 [employer]. All rights reserved. Clinically reviewed by the [facility]. [employer] 251431 - REV 01/24.  Learning About Being Physically Active  What is physical activity?     Being  "physically active means doing any kind of activity that gets your body moving.  The types of physical activity that can help you get fit and stay healthy include:  Aerobic or \"cardio\" activities. These make your heart beat faster and make you breathe harder, such as brisk walking, riding a bike, or running. They strengthen your heart and lungs and build up your endurance.  Strength training activities. These make your muscles work against, or \"resist,\" something. Examples include lifting weights or doing push-ups. These activities help tone and strengthen your muscles and bones.  Stretches. These let you move your joints and muscles through their full range of motion. Stretching helps you be more flexible.  Reaching a balance between these three types of physical activity is important because each one contributes to your overall fitness.  What are the benefits of being active?  Being active is one of the best things you can do for your health. It helps you to:  Feel stronger and have more energy to do all the things you like to do.  Focus better at school or work.  Feel, think, and sleep better.  Reach and stay at a healthy weight.  Lose fat and build lean muscle.  Lower your risk for serious health problems, including diabetes, heart attack, high blood pressure, and some cancers.  Keep your heart, lungs, bones, muscles, and joints strong and healthy.  How can you make being active part of your life?  Start slowly. Make it your long-term goal to get at least 30 minutes of exercise on most days of the week. Walking is a good choice. You also may want to do other activities, such as running, swimming, cycling, or playing tennis or team sports.  Pick activities that you like--ones that make your heart beat faster, your muscles stronger, and your muscles and joints more flexible. If you find more than one thing you like doing, do them all. You don't have to do the same thing every day.  Get your heart pumping every day. " "Any activity that makes your heart beat faster and keeps it at that rate for a while counts.  Here are some great ways to get your heart beating faster:  Go for a brisk walk, run, or hike.  Go for a swim or bike ride.  Take an online exercise class or dance.  Play a game of touch football, basketball, or soccer.  Play tennis, pickleball, or racquetball.  Climb stairs.  Even some household chores can be aerobic. Just do them at a faster pace. Raking or mowing the lawn, sweeping the garage, and vacuuming and cleaning your home all can help get your heart rate up.  Strengthen your muscles during the week. You don't have to lift heavy weights or grow big, bulky muscles to get stronger. Doing a few simple activities that make your muscles work against, or \"resist,\" something can help you get stronger. Aim for at least twice a week.  For example, you can:  Do push-ups or sit-ups, which use your own body weight as resistance.  Lift weights or dumbbells or use stretch bands at home or in a gym or community center.  Stretch your muscles often. Stretching will help you as you become more active. It can help you stay flexible and loosen tight muscles. It can also help improve your balance and posture and can be a great way to relax.  Be sure to stretch the muscles you'll be using when you work out. It's best to warm your muscles slightly before you stretch them. Walk or do some other light aerobic activity for a few minutes. Then start stretching.  When you stretch your muscles:  Do it slowly. Stretching is not about going fast or making sudden movements.  Don't push or bounce during a stretch.  Hold each stretch for at least 15 to 30 seconds, if you can. You should feel a stretch in the muscle, but not pain.  Breathe out as you do the stretch. Then breathe in as you hold the stretch. Don't hold your breath.  If you're worried about how more activity might affect your health, have a checkup before you start. Follow any special " "advice your doctor gives you for getting a smart start.  Where can you learn more?  Go to https://www.[employer].net/patiented  Enter W332 in the search box to learn more about \"Learning About Being Physically Active.\"  Current as of: July 31, 2024  Content Version: 14.3    2024 [employer]   Care instructions adapted under license by your healthcare professional. If you have questions about a medical condition or this instruction, always ask your healthcare professional. [employer] disclaims any warranty or liability for your use of this information.    Eating Healthy Foods: Care Instructions  With every meal, you can make healthy food choices. Try to eat a variety of fruits, vegetables, whole grains, lean proteins, and low-fat dairy products. This can help you get the right balance of nutrients, including vitamins and minerals. Small changes add up over time. You can start by adding one healthy food to your meals each day.    Try to make half your plate fruits and vegetables, one-fourth whole grains, and one-fourth lean proteins. Try including dairy with your meals.   Eat more fruits and vegetables. Try to have them with most meals and snacks.   Foods for healthy eating        Fruits   These can be fresh, frozen, canned, or dried.  Try to choose whole fruit rather than fruit juice.  Eat a variety of colors.        Vegetables   These can be fresh, frozen, canned, or dried.  Beans, peas, and lentils count too.        Whole grains   Choose whole-grain breads, cereals, and noodles.  Try brown rice.        Lean proteins   These can include lean meat, poultry, fish, and eggs.  You can also have tofu, beans, peas, lentils, nuts, and seeds.        Dairy   Try milk, yogurt, and cheese.  Choose low-fat or fat-free when you can.  If you need to, use lactose-free milk or fortified plant-based milk products, such as soy milk.        Water   Drink water when you're thirsty.  Limit sugar-sweetened drinks, " "including soda, fruit drinks, and sports drinks.  Where can you learn more?  Go to https://www.health[employer].net/patiented  Enter T756 in the search box to learn more about \"Eating Healthy Foods: Care Instructions.\"  Current as of: September 20, 2023  Content Version: 14.3    2024 [employer]   Care instructions adapted under license by your healthcare professional. If you have questions about a medical condition or this instruction, always ask your healthcare professional. [employer] disclaims any warranty or liability for your use of this information.    Eating Healthy Foods: Care Instructions  With every meal, you can make healthy food choices. Try to eat a variety of fruits, vegetables, whole grains, lean proteins, and low-fat dairy products. This can help you get the right balance of nutrients, including vitamins and minerals. Small changes add up over time. You can start by adding one healthy food to your meals each day.    Try to make half your plate fruits and vegetables, one-fourth whole grains, and one-fourth lean proteins. Try including dairy with your meals.   Eat more fruits and vegetables. Try to have them with most meals and snacks.   Foods for healthy eating        Fruits   These can be fresh, frozen, canned, or dried.  Try to choose whole fruit rather than fruit juice.  Eat a variety of colors.        Vegetables   These can be fresh, frozen, canned, or dried.  Beans, peas, and lentils count too.        Whole grains   Choose whole-grain breads, cereals, and noodles.  Try brown rice.        Lean proteins   These can include lean meat, poultry, fish, and eggs.  You can also have tofu, beans, peas, lentils, nuts, and seeds.        Dairy   Try milk, yogurt, and cheese.  Choose low-fat or fat-free when you can.  If you need to, use lactose-free milk or fortified plant-based milk products, such as soy milk.        Water   Drink water when you're thirsty.  Limit sugar-sweetened drinks, " "including soda, fruit drinks, and sports drinks.  Where can you learn more?  Go to https://www.[employer].net/patiented  Enter T756 in the search box to learn more about \"Eating Healthy Foods: Care Instructions.\"  Current as of: September 20, 2023  Content Version: 14.3    2024 [employer]   Care instructions adapted under license by your healthcare professional. If you have questions about a medical condition or this instruction, always ask your healthcare professional. [employer] disclaims any warranty or liability for your use of this information.    Learning About Being Physically Active  What is physical activity?     Being physically active means doing any kind of activity that gets your body moving.  The types of physical activity that can help you get fit and stay healthy include:  Aerobic or \"cardio\" activities. These make your heart beat faster and make you breathe harder, such as brisk walking, riding a bike, or running. They strengthen your heart and lungs and build up your endurance.  Strength training activities. These make your muscles work against, or \"resist,\" something. Examples include lifting weights or doing push-ups. These activities help tone and strengthen your muscles and bones.  Stretches. These let you move your joints and muscles through their full range of motion. Stretching helps you be more flexible.  Reaching a balance between these three types of physical activity is important because each one contributes to your overall fitness.  What are the benefits of being active?  Being active is one of the best things you can do for your health. It helps you to:  Feel stronger and have more energy to do all the things you like to do.  Focus better at school or work.  Feel, think, and sleep better.  Reach and stay at a healthy weight.  Lose fat and build lean muscle.  Lower your risk for serious health problems, including diabetes, heart attack, high blood pressure, and " "some cancers.  Keep your heart, lungs, bones, muscles, and joints strong and healthy.  How can you make being active part of your life?  Start slowly. Make it your long-term goal to get at least 30 minutes of exercise on most days of the week. Walking is a good choice. You also may want to do other activities, such as running, swimming, cycling, or playing tennis or team sports.  Pick activities that you like--ones that make your heart beat faster, your muscles stronger, and your muscles and joints more flexible. If you find more than one thing you like doing, do them all. You don't have to do the same thing every day.  Get your heart pumping every day. Any activity that makes your heart beat faster and keeps it at that rate for a while counts.  Here are some great ways to get your heart beating faster:  Go for a brisk walk, run, or hike.  Go for a swim or bike ride.  Take an online exercise class or dance.  Play a game of touch football, basketball, or soccer.  Play tennis, pickleball, or racquetball.  Climb stairs.  Even some household chores can be aerobic. Just do them at a faster pace. Raking or mowing the lawn, sweeping the garage, and vacuuming and cleaning your home all can help get your heart rate up.  Strengthen your muscles during the week. You don't have to lift heavy weights or grow big, bulky muscles to get stronger. Doing a few simple activities that make your muscles work against, or \"resist,\" something can help you get stronger. Aim for at least twice a week.  For example, you can:  Do push-ups or sit-ups, which use your own body weight as resistance.  Lift weights or dumbbells or use stretch bands at home or in a gym or community center.  Stretch your muscles often. Stretching will help you as you become more active. It can help you stay flexible and loosen tight muscles. It can also help improve your balance and posture and can be a great way to relax.  Be sure to stretch the muscles you'll be " "using when you work out. It's best to warm your muscles slightly before you stretch them. Walk or do some other light aerobic activity for a few minutes. Then start stretching.  When you stretch your muscles:  Do it slowly. Stretching is not about going fast or making sudden movements.  Don't push or bounce during a stretch.  Hold each stretch for at least 15 to 30 seconds, if you can. You should feel a stretch in the muscle, but not pain.  Breathe out as you do the stretch. Then breathe in as you hold the stretch. Don't hold your breath.  If you're worried about how more activity might affect your health, have a checkup before you start. Follow any special advice your doctor gives you for getting a smart start.  Where can you learn more?  Go to https://www.[employer].net/patiented  Enter W332 in the search box to learn more about \"Learning About Being Physically Active.\"  Current as of: July 31, 2024  Content Version: 14.3    2024 [employer]   Care instructions adapted under license by your healthcare professional. If you have questions about a medical condition or this instruction, always ask your healthcare professional. [employer] disclaims any warranty or liability for your use of this information.       "

## (undated) DEVICE — GOWN IMPERVIOUS 2XL BLUE

## (undated) DEVICE — TUBING SUCTION MEDI-VAC 1/4"X20' N620A

## (undated) DEVICE — SNARE CAPIVATOR ROUND COLD SNR BX10 M00561101

## (undated) DEVICE — SUCTION MANIFOLD NEPTUNE 2 SYS 1 PORT 702-025-000

## (undated) DEVICE — SPECIMEN CONTAINER 3OZ W/FORMALIN 59901

## (undated) DEVICE — ENDO FORCEP SPIKED SERRATED SHAFT JUMBO 239CM G56998

## (undated) DEVICE — SOL WATER IRRIG 500ML BOTTLE 2F7113

## (undated) DEVICE — KIT ENDO TURNOVER/PROCEDURE CARRY-ON 101822

## (undated) DEVICE — KIT ENDO FIRST STEP DISINFECTANT 200ML W/POUCH EP-4